# Patient Record
Sex: MALE | Race: WHITE | Employment: UNEMPLOYED | URBAN - METROPOLITAN AREA
[De-identification: names, ages, dates, MRNs, and addresses within clinical notes are randomized per-mention and may not be internally consistent; named-entity substitution may affect disease eponyms.]

---

## 2019-06-23 ENCOUNTER — HOSPITAL ENCOUNTER (EMERGENCY)
Age: 54
Discharge: HOME | End: 2019-06-23
Payer: COMMERCIAL

## 2019-06-23 VITALS
HEART RATE: 87 BPM | DIASTOLIC BLOOD PRESSURE: 80 MMHG | RESPIRATION RATE: 18 BRPM | OXYGEN SATURATION: 96 % | SYSTOLIC BLOOD PRESSURE: 133 MMHG | TEMPERATURE: 98.3 F

## 2019-06-23 DIAGNOSIS — L03.114 CELLULITIS OF LEFT UPPER EXTREMITY: ICD-10-CM

## 2019-06-23 DIAGNOSIS — M70.32 BURSITIS OF LEFT ELBOW, UNSPECIFIED BURSA: Primary | ICD-10-CM

## 2019-06-23 PROCEDURE — 99282 EMERGENCY DEPT VISIT SF MDM: CPT

## 2019-06-23 PROCEDURE — 6370000000 HC RX 637 (ALT 250 FOR IP): Performed by: PHYSICIAN ASSISTANT

## 2019-06-23 RX ORDER — SULFAMETHOXAZOLE AND TRIMETHOPRIM 800; 160 MG/1; MG/1
2 TABLET ORAL ONCE
Status: COMPLETED | OUTPATIENT
Start: 2019-06-23 | End: 2019-06-23

## 2019-06-23 RX ORDER — CEPHALEXIN 250 MG/1
500 CAPSULE ORAL ONCE
Status: COMPLETED | OUTPATIENT
Start: 2019-06-23 | End: 2019-06-23

## 2019-06-23 RX ORDER — SULFAMETHOXAZOLE AND TRIMETHOPRIM 800; 160 MG/1; MG/1
2 TABLET ORAL 2 TIMES DAILY
Qty: 40 TABLET | Refills: 0 | Status: SHIPPED | OUTPATIENT
Start: 2019-06-23 | End: 2019-07-03

## 2019-06-23 RX ORDER — CEPHALEXIN 500 MG/1
500 CAPSULE ORAL 4 TIMES DAILY
Qty: 40 CAPSULE | Refills: 0 | Status: SHIPPED | OUTPATIENT
Start: 2019-06-23

## 2019-06-23 RX ADMIN — CEPHALEXIN 500 MG: 250 CAPSULE ORAL at 10:57

## 2019-06-23 RX ADMIN — SULFAMETHOXAZOLE AND TRIMETHOPRIM 2 TABLET: 800; 160 TABLET ORAL at 10:57

## 2019-06-23 ASSESSMENT — PAIN DESCRIPTION - PAIN TYPE: TYPE: ACUTE PAIN

## 2019-06-23 ASSESSMENT — PAIN SCALES - GENERAL: PAINLEVEL_OUTOF10: 4

## 2019-06-23 NOTE — ED PROVIDER NOTES
**EVALUATED BY ADVANCED PRACTICE PROVIDER**        Bettie Howard 58 ENCOUNTER      Pt Name: George Zapata  BTK:[id number]  Robertotrongfkevin 1965  Date of evaluation: 6/23/2019  Provider: Judith Dozier PA-C      Chief Complaint:    Chief Complaint   Patient presents with    Abscess     pt noticed yesterday that his left elbow was swelling noted some sort of poss bug bite. red, inflammed and poss abscess       Nursing Notes, Past Medical Hx, Past Surgical Hx, Social Hx, Allergies, and Family Hx were all reviewed and agreed with or any disagreements were addressed in the HPI.    HPI:  (Location, Duration, Timing, Severity,Quality, Assoc Sx, Context, Modifying factors)  This is a  47 y.o. male that presents to the emergency department with a chief complaint of left elbow swelling and redness that began yesterday. Patient denies any history of IV drug use, kidney disease or diabetes. Denies injury, trauma, nausea, vomiting, fevers. Does have history of MRSA and had left knee surgery due to infected bursa in the past.  Denies any drainage, numbness or decreased range of motion. Rates the pain a 4 out of 10. Denies any other symptoms. PastMedical/Surgical History:      Diagnosis Date    ADD (attention deficit disorder)     Anxiety          Procedure Laterality Date    CARPAL TUNNEL RELEASE      KNEE SURGERY  2006       Medications:  Previous Medications    AMPHETAMINE-DEXTROAMPHETAMINE (ADDERALL, 10MG,) 10 MG TABLET    Take 10 mg by mouth daily. 10-15 mg two times a day    CELECOXIB (CELEBREX) 100 MG CAPSULE    Take 100 mg by mouth daily. CLONAZEPAM (KLONOPIN) 1 MG TABLET    Take 1 mg by mouth 2 times daily as needed for Anxiety. IBUPROFEN (ADVIL;MOTRIN) 800 MG TABLET    Take 1 tablet by mouth every 6 hours as needed for Pain. IBUPROFEN (IBU) 600 MG TABLET    Take 1 tablet by mouth every 8 hours as needed for Pain for 20 doses.     MELOXICAM returned at the time of this note. RADIOLOGY:   Non-plain film images such as CT, Ultrasound and MRI are read by the radiologist. Jewel Gale PA-C have directly visualized the radiologic plain film image(s) with the below findings:        Interpretation per the Radiologist below, if available at the time of thisnote:    No orders to display        No results found. MEDICAL DECISION MAKING / ED COURSE:      PROCEDURES:   Procedures    None    Patient was given:  Medications   cephALEXin (KEFLEX) capsule 500 mg (500 mg Oral Given 6/23/19 1057)   sulfamethoxazole-trimethoprim (BACTRIM DS;SEPTRA DS) 800-160 MG per tablet 2 tablet (2 tablets Oral Given 6/23/19 1057)       Patient presented with some left elbow swelling and redness. There is some mild cellulitis around the left arm with evidence of bursitis. Patient has full range of motion with minimal to no pain of movement of his left elbow. He would prefer to go home. He states this is happened in the past.  Suspect infective bursitis with some cellulitis. Low suspicion for septic arthritis, necrotizing fasciitis, cellulitis requiring IV antibiotics at this time. Patient has no bony tenderness. He will follow-up with orthopedics understood the strict return precautions to return for fevers, spreading redness or any worsening of symptoms. Will be given his first dose antibiotics here. He understood the strict return precautions and was stable discharge. The patient tolerated their visit well. I evaluated the patient. The physician was available for consultation as needed. The patient and / or the family were informed of the results of anytests, a time was given to answer questions, a plan was proposed and they agreed with plan. CLINICAL IMPRESSION:  1. Bursitis of left elbow, unspecified bursa    2.  Cellulitis of left upper extremity        DISPOSITION Decision To Discharge 06/23/2019 10:49:43 AM      PATIENT REFERRED TO:  Ijeoma Barrett

## 2019-06-23 NOTE — ED NOTES
Discharge instructions discussed with pt; verbalized understanding. Discussed all new medications (Keflex and Bactrim) use and side effects; verbalized understanding. Discussed follow up with Dr. Mariano Barahona; verbalized understanding. All questions answered. Pt d/c home with all of belongings.         Ivan Khan RN  06/23/19 [id number]

## 2020-01-22 ENCOUNTER — HOSPITAL ENCOUNTER (OUTPATIENT)
Dept: MRI IMAGING | Age: 55
Discharge: HOME | End: 2020-01-22
Payer: COMMERCIAL

## 2020-01-22 PROCEDURE — 72148 MRI LUMBAR SPINE W/O DYE: CPT

## 2022-06-14 ENCOUNTER — APPOINTMENT (OUTPATIENT)
Dept: GENERAL RADIOLOGY | Age: 57
End: 2022-06-14
Payer: COMMERCIAL

## 2022-06-14 ENCOUNTER — HOSPITAL ENCOUNTER (EMERGENCY)
Age: 57
Discharge: HOME | End: 2022-06-14
Payer: COMMERCIAL

## 2022-06-14 VITALS
SYSTOLIC BLOOD PRESSURE: 122 MMHG | BODY MASS INDEX: 29.22 KG/M2 | WEIGHT: 235 LBS | OXYGEN SATURATION: 96 % | DIASTOLIC BLOOD PRESSURE: 81 MMHG | TEMPERATURE: 98.2 F | HEIGHT: 75 IN | HEART RATE: 80 BPM | RESPIRATION RATE: 20 BRPM

## 2022-06-14 DIAGNOSIS — R07.9 CHEST PAIN, UNSPECIFIED TYPE: Primary | ICD-10-CM

## 2022-06-14 LAB
A/G RATIO: 1.4 (ref 1.1–2.2)
ALBUMIN SERPL-MCNC: 4.3 G/DL (ref 3.4–5)
ALP BLD-CCNC: 117 U/L (ref 40–129)
ALT SERPL-CCNC: 30 U/L (ref 10–40)
ANION GAP SERPL CALCULATED.3IONS-SCNC: 10 MMOL/L (ref 3–16)
AST SERPL-CCNC: 20 U/L (ref 15–37)
BASOPHILS ABSOLUTE: 0.1 K/UL (ref 0–0.2)
BASOPHILS RELATIVE PERCENT: 0.7 %
BILIRUB SERPL-MCNC: <0.2 MG/DL (ref 0–1)
BUN BLDV-MCNC: 19 MG/DL (ref 7–20)
CALCIUM SERPL-MCNC: 9.7 MG/DL (ref 8.3–10.6)
CHLORIDE BLD-SCNC: 104 MMOL/L (ref 99–110)
CO2: 22 MMOL/L (ref 21–32)
CREAT SERPL-MCNC: 1.1 MG/DL (ref 0.9–1.3)
EOSINOPHILS ABSOLUTE: 0.2 K/UL (ref 0–0.6)
EOSINOPHILS RELATIVE PERCENT: 2.5 %
GFR AFRICAN AMERICAN: >60
GFR NON-AFRICAN AMERICAN: >60
GLUCOSE BLD-MCNC: 119 MG/DL (ref 70–99)
HCT VFR BLD CALC: 45.6 % (ref 40.5–52.5)
HEMOGLOBIN: 15.3 G/DL (ref 13.5–17.5)
INR BLD: 0.92 (ref 0.87–1.14)
LIPASE: 43 U/L (ref 13–60)
LYMPHOCYTES ABSOLUTE: 2.4 K/UL (ref 1–5.1)
LYMPHOCYTES RELATIVE PERCENT: 31.9 %
MCH RBC QN AUTO: 32.9 PG (ref 26–34)
MCHC RBC AUTO-ENTMCNC: 33.6 G/DL (ref 31–36)
MCV RBC AUTO: 98 FL (ref 80–100)
MONOCYTES ABSOLUTE: 1 K/UL (ref 0–1.3)
MONOCYTES RELATIVE PERCENT: 12.7 %
NEUTROPHILS ABSOLUTE: 4 K/UL (ref 1.7–7.7)
NEUTROPHILS RELATIVE PERCENT: 52.2 %
PDW BLD-RTO: 13.4 % (ref 12.4–15.4)
PLATELET # BLD: 312 K/UL (ref 135–450)
PMV BLD AUTO: 8.8 FL (ref 5–10.5)
POTASSIUM REFLEX MAGNESIUM: 4.5 MMOL/L (ref 3.5–5.1)
PRO-BNP: 12 PG/ML (ref 0–124)
PROTHROMBIN TIME: 12.2 SEC (ref 11.7–14.5)
RBC # BLD: 4.66 M/UL (ref 4.2–5.9)
SODIUM BLD-SCNC: 136 MMOL/L (ref 136–145)
TOTAL PROTEIN: 7.3 G/DL (ref 6.4–8.2)
TROPONIN: <0.01 NG/ML
WBC # BLD: 7.6 K/UL (ref 4–11)

## 2022-06-14 PROCEDURE — 85610 PROTHROMBIN TIME: CPT

## 2022-06-14 PROCEDURE — 85025 COMPLETE CBC W/AUTO DIFF WBC: CPT

## 2022-06-14 PROCEDURE — 84484 ASSAY OF TROPONIN QUANT: CPT

## 2022-06-14 PROCEDURE — 83880 ASSAY OF NATRIURETIC PEPTIDE: CPT

## 2022-06-14 PROCEDURE — 80053 COMPREHEN METABOLIC PANEL: CPT

## 2022-06-14 PROCEDURE — 93005 ELECTROCARDIOGRAM TRACING: CPT

## 2022-06-14 PROCEDURE — 83690 ASSAY OF LIPASE: CPT

## 2022-06-14 PROCEDURE — 71045 X-RAY EXAM CHEST 1 VIEW: CPT

## 2022-06-14 PROCEDURE — 99285 EMERGENCY DEPT VISIT HI MDM: CPT

## 2022-06-14 PROCEDURE — 6370000000 HC RX 637 (ALT 250 FOR IP): Performed by: PHYSICIAN ASSISTANT

## 2022-06-14 RX ORDER — VALSARTAN 160 MG/1
160 TABLET ORAL DAILY
COMMUNITY

## 2022-06-14 RX ORDER — MAGNESIUM HYDROXIDE/ALUMINUM HYDROXICE/SIMETHICONE 120; 1200; 1200 MG/30ML; MG/30ML; MG/30ML
SUSPENSION ORAL
Status: DISCONTINUED
Start: 2022-06-14 | End: 2022-06-14 | Stop reason: HOSPADM

## 2022-06-14 RX ORDER — AMLODIPINE BESYLATE 10 MG/1
10 TABLET ORAL DAILY
COMMUNITY

## 2022-06-14 RX ADMIN — LIDOCAINE HYDROCHLORIDE: 20 SOLUTION ORAL; TOPICAL at 16:39

## 2022-06-14 ASSESSMENT — PAIN SCALES - GENERAL: PAINLEVEL_OUTOF10: 6

## 2022-06-14 ASSESSMENT — PAIN DESCRIPTION - LOCATION: LOCATION: CHEST

## 2022-06-14 ASSESSMENT — PAIN - FUNCTIONAL ASSESSMENT: PAIN_FUNCTIONAL_ASSESSMENT: 0-10

## 2022-06-14 NOTE — ED NOTES
Discharge and education instructions reviewed. Patient verbalized understanding, teach-back successful. Patient denied questions at this time. No acute distress noted. Patient instructed to follow-up as noted - return to emergency department if symptoms worsen. Patient verbalized understanding. Discharged per EDMD with discharged instructions.        Ny Jewell RN  06/14/22 [id number]

## 2022-06-14 NOTE — ED PROVIDER NOTES
PEPE. I have evaluated this patient. My supervising physician was available for consultation. Chief Complaint:   Chief Complaint   Patient presents with    Chest Pain     Pt c/o mid sternal constant CP times 1 week with bilat hand numbness that started at the same time. ED Course & Medical Decision Making  55 y.o. male presenting with chest pain. -CBC/chem: Normal  -LFT/Lipase: not concerning    -Trop: Negative  -BNP: 12    -Chest xr: lungs clear    -EKG: NSR    MDM: This 52-year-old male presents with chest pain x1 month. His chest pain is nonexertional, nonradiating does not get better or worse. His cardiac work-up is negative. His heart score is 2. He is [place] Low risk. We observed him in the emergency department, and feel as though he is stable for discharge with close primary care follow-up    The GI cocktail I gave him did not help his pain at all. Final Clinical Impression & Plan:  Chest pain  - f/u with PCP. Call and schedule tomorrow  - ED return precautions given and reviewed, questions answered. ---------------------------------------------------------------------------------------------------------------  HPI:  PMH significant for anxiety     Presenting with left-sided chest pain. Nonradiating nonexertional.  No shortness of breath. No fevers, chills, nausea, vomiting. He denies unilateral leg swelling. No history of DVT. No hemoptysis, no malignancy no immobilization or surgery recently. He is recently lost both parents in the past few months. He has no family history of early cardiac death. Is this patient to be included in the SEP-1 Core Measure due to severe sepsis or septic shock? No   Exclusion criteria - the patient is NOT to be included for SEP-1 Core Measure due to:   Infection is not suspected      Medical Hx: Past medical history reviewed, and pertinent for:     Past Medical History:   Diagnosis Date    ADD (attention deficit disorder)     Anxiety        There is no problem list on file for this patient. Surgical Hx:   Past surgical history reviewed, and pertinent for:      Past Surgical History:   Procedure Laterality Date    CARPAL TUNNEL RELEASE      KNEE SURGERY  2006       Social Hx:       Social History     Socioeconomic History    Marital status: Single     Spouse name: Not on file    Number of children: Not on file    Years of education: Not on file    Highest education level: Not on file   Occupational History    Not on file   Tobacco Use    Smoking status: Current Every Day Smoker     Packs/day: 1.00     Types: Cigarettes    Smokeless tobacco: Never Used   Substance and Sexual Activity    Alcohol use: No    Drug use: Yes     Types: Marijuana Norval Remak)    Sexual activity: Not Currently   Other Topics Concern    Not on file   Social History Narrative    Not on file     Social Determinants of Health     Financial Resource Strain:     Difficulty of Paying Living Expenses: Not on file   Food Insecurity:     Worried About Running Out of Food in the Last Year: Not on file    Court of Food in the Last Year: Not on file   Transportation Needs:     Lack of Transportation (Medical): Not on file    Lack of Transportation (Non-Medical):  Not on file   Physical Activity:     Days of Exercise per Week: Not on file    Minutes of Exercise per Session: Not on file   Stress:     Feeling of Stress : Not on file   Social Connections:     Frequency of Communication with Friends and Family: Not on file    Frequency of Social Gatherings with Friends and Family: Not on file    Attends Zoroastrian Services: Not on file    Active Member of Clubs or Organizations: Not on file    Attends Club or Organization Meetings: Not on file    Marital Status: Not on file   Intimate Partner Violence:     Fear of Current or Ex-Partner: Not on file    Emotionally Abused: Not on file    Physically Abused: Not on file    Sexually Abused: Not on file Housing Stability:     Unable to Pay for Housing in the Last Year: Not on file    Number of Places Lived in the Last Year: Not on file    Unstable Housing in the Last Year: Not on file         Medications:  Previous Medications    AMLODIPINE (NORVASC) 10 MG TABLET    Take 10 mg by mouth daily    AMPHETAMINE-DEXTROAMPHETAMINE (ADDERALL, 10MG,) 10 MG TABLET    Take 10 mg by mouth daily. 10-15 mg two times a day    CELECOXIB (CELEBREX) 100 MG CAPSULE    Take 100 mg by mouth daily. CEPHALEXIN (KEFLEX) 500 MG CAPSULE    Take 1 capsule by mouth 4 times daily    CLONAZEPAM (KLONOPIN) 1 MG TABLET    Take 1 mg by mouth 2 times daily as needed for Anxiety. IBUPROFEN (ADVIL;MOTRIN) 800 MG TABLET    Take 1 tablet by mouth every 6 hours as needed for Pain. IBUPROFEN (IBU) 600 MG TABLET    Take 1 tablet by mouth every 8 hours as needed for Pain for 20 doses. MELOXICAM (MOBIC) 15 MG TABLET    Take 15 mg by mouth daily. PREGABALIN (LYRICA) 150 MG CAPSULE    Take 150 mg by mouth 2 times daily. VALSARTAN (DIOVAN) 160 MG TABLET    Take 160 mg by mouth daily       Allergies:  Patient has no known allergies.     ROS:  10pt review of systems was performed and was negative except as noted in HPI     Imaging:  No orders to display       Labs:  Results for orders placed or performed during the hospital encounter of 06/14/22   EKG 12 Lead   Result Value Ref Range    Ventricular Rate 77 BPM    Atrial Rate 77 BPM    P-R Interval 128 ms    QRS Duration 100 ms    Q-T Interval 376 ms    QTc Calculation (Bazett) 425 ms    P Axis 70 degrees    R Axis 38 degrees    T Axis 61 degrees    Diagnosis Normal sinus rhythmNormal ECG        Screenings:     [place] Coma Scale  Eye Opening: Spontaneous  Best Verbal Response: Oriented  Best Motor Response: Obeys commands  [place] Coma Scale Score: 15              Physical Exam:  Vitals: /81   Pulse 80   Temp 98.2 °F (36.8 °C) (Oral)   Resp 20   Ht 6' 3\" (1.905 m)   Wt 235 lb

## 2022-06-16 LAB
EKG ATRIAL RATE: 77 BPM
EKG DIAGNOSIS: NORMAL
EKG P AXIS: 70 DEGREES
EKG P-R INTERVAL: 128 MS
EKG Q-T INTERVAL: 376 MS
EKG QRS DURATION: 100 MS
EKG QTC CALCULATION (BAZETT): 425 MS
EKG R AXIS: 38 DEGREES
EKG T AXIS: 61 DEGREES
EKG VENTRICULAR RATE: 77 BPM

## 2022-06-16 PROCEDURE — 93010 ELECTROCARDIOGRAM REPORT: CPT | Performed by: INTERNAL MEDICINE

## 2024-02-10 ENCOUNTER — HOSPITAL ENCOUNTER (EMERGENCY)
Age: 59
Discharge: ANOTHER HOSPITAL | End: 2024-02-10
Attending: EMERGENCY MEDICINE
Payer: COMMERCIAL

## 2024-02-10 ENCOUNTER — APPOINTMENT (OUTPATIENT)
Dept: CT IMAGING | Age: 59
End: 2024-02-10
Payer: COMMERCIAL

## 2024-02-10 ENCOUNTER — APPOINTMENT (OUTPATIENT)
Dept: GENERAL RADIOLOGY | Age: 59
End: 2024-02-10
Payer: COMMERCIAL

## 2024-02-10 ENCOUNTER — HOSPITAL ENCOUNTER (INPATIENT)
Age: 59
LOS: 4 days | Discharge: HOME | DRG: 045 | End: 2024-02-14
Attending: INTERNAL MEDICINE | Admitting: INTERNAL MEDICINE
Payer: COMMERCIAL

## 2024-02-10 VITALS
DIASTOLIC BLOOD PRESSURE: 89 MMHG | TEMPERATURE: 97.8 F | OXYGEN SATURATION: 94 % | RESPIRATION RATE: 13 BRPM | WEIGHT: 241.1 LBS | BODY MASS INDEX: 31.95 KG/M2 | HEART RATE: 64 BPM | SYSTOLIC BLOOD PRESSURE: 149 MMHG | HEIGHT: 73 IN

## 2024-02-10 DIAGNOSIS — I63.9 CEREBROVASCULAR ACCIDENT (CVA), UNSPECIFIED MECHANISM (HCC): Primary | ICD-10-CM

## 2024-02-10 DIAGNOSIS — I10 ESSENTIAL HYPERTENSION: ICD-10-CM

## 2024-02-10 DIAGNOSIS — K11.8 PAROTID NODULE: ICD-10-CM

## 2024-02-10 PROBLEM — M79.7 FIBROMYOSITIS: Status: ACTIVE | Noted: 2019-10-29

## 2024-02-10 PROBLEM — M54.16 LUMBAR RADICULOPATHY, RIGHT: Status: ACTIVE | Noted: 2019-10-29

## 2024-02-10 PROBLEM — M70.61 TROCHANTERIC BURSITIS OF RIGHT HIP: Status: ACTIVE | Noted: 2017-02-10

## 2024-02-10 PROBLEM — M47.816 DEGENERATIVE ARTHRITIS OF LUMBAR SPINE: Status: ACTIVE | Noted: 2019-10-29

## 2024-02-10 PROBLEM — G89.4 CHRONIC PAIN DISORDER: Status: ACTIVE | Noted: 2019-10-29

## 2024-02-10 LAB
ALBUMIN SERPL-MCNC: 3.7 G/DL (ref 3.4–5)
ALBUMIN/GLOB SERPL: 1.2 {RATIO} (ref 1.1–2.2)
ALP SERPL-CCNC: 94 U/L (ref 40–129)
ALT SERPL-CCNC: 24 U/L (ref 10–40)
ANION GAP SERPL CALCULATED.3IONS-SCNC: 12 MMOL/L (ref 3–16)
ANION GAP SERPL CALCULATED.3IONS-SCNC: 13 MMOL/L (ref 3–16)
APTT BLD: 24.3 SEC (ref 22.7–35.9)
AST SERPL-CCNC: 17 U/L (ref 15–37)
BASOPHILS # BLD: 0.1 K/UL (ref 0–0.2)
BASOPHILS # BLD: 0.1 K/UL (ref 0–0.2)
BASOPHILS NFR BLD: 0.5 %
BASOPHILS NFR BLD: 0.7 %
BILIRUB SERPL-MCNC: 0.7 MG/DL (ref 0–1)
BUN SERPL-MCNC: 12 MG/DL (ref 7–20)
BUN SERPL-MCNC: 12 MG/DL (ref 7–20)
CALCIUM SERPL-MCNC: 8.6 MG/DL (ref 8.3–10.6)
CALCIUM SERPL-MCNC: 8.7 MG/DL (ref 8.3–10.6)
CHLORIDE SERPL-SCNC: 103 MMOL/L (ref 99–110)
CHLORIDE SERPL-SCNC: 106 MMOL/L (ref 99–110)
CK SERPL-CCNC: 245 U/L (ref 39–308)
CO2 SERPL-SCNC: 20 MMOL/L (ref 21–32)
CO2 SERPL-SCNC: 22 MMOL/L (ref 21–32)
CREAT SERPL-MCNC: 0.9 MG/DL (ref 0.9–1.3)
CREAT SERPL-MCNC: 1 MG/DL (ref 0.9–1.3)
DEPRECATED RDW RBC AUTO: 12.8 % (ref 12.4–15.4)
DEPRECATED RDW RBC AUTO: 12.9 % (ref 12.4–15.4)
EOSINOPHIL # BLD: 0.1 K/UL (ref 0–0.6)
EOSINOPHIL # BLD: 0.1 K/UL (ref 0–0.6)
EOSINOPHIL NFR BLD: 0.5 %
EOSINOPHIL NFR BLD: 0.5 %
GFR SERPLBLD CREATININE-BSD FMLA CKD-EPI: >60 ML/MIN/{1.73_M2}
GFR SERPLBLD CREATININE-BSD FMLA CKD-EPI: >60 ML/MIN/{1.73_M2}
GLUCOSE BLD-MCNC: 105 MG/DL (ref 70–99)
GLUCOSE SERPL-MCNC: 144 MG/DL (ref 70–99)
GLUCOSE SERPL-MCNC: 99 MG/DL (ref 70–99)
HCT VFR BLD AUTO: 41.1 % (ref 40.5–52.5)
HCT VFR BLD AUTO: 42.9 % (ref 40.5–52.5)
HGB BLD-MCNC: 13.7 G/DL (ref 13.5–17.5)
HGB BLD-MCNC: 14.7 G/DL (ref 13.5–17.5)
INR PPP: 0.98 (ref 0.84–1.16)
LYMPHOCYTES # BLD: 2.1 K/UL (ref 1–5.1)
LYMPHOCYTES # BLD: 2.7 K/UL (ref 1–5.1)
LYMPHOCYTES NFR BLD: 18.7 %
LYMPHOCYTES NFR BLD: 25 %
MAGNESIUM SERPL-MCNC: 1.9 MG/DL (ref 1.8–2.4)
MCH RBC QN AUTO: 32.3 PG (ref 26–34)
MCH RBC QN AUTO: 32.9 PG (ref 26–34)
MCHC RBC AUTO-ENTMCNC: 33.3 G/DL (ref 31–36)
MCHC RBC AUTO-ENTMCNC: 34.3 G/DL (ref 31–36)
MCV RBC AUTO: 96 FL (ref 80–100)
MCV RBC AUTO: 97.1 FL (ref 80–100)
MONOCYTES # BLD: 1.1 K/UL (ref 0–1.3)
MONOCYTES # BLD: 1.3 K/UL (ref 0–1.3)
MONOCYTES NFR BLD: 10 %
MONOCYTES NFR BLD: 11.6 %
NEUTROPHILS # BLD: 6.9 K/UL (ref 1.7–7.7)
NEUTROPHILS # BLD: 7.8 K/UL (ref 1.7–7.7)
NEUTROPHILS NFR BLD: 63.8 %
NEUTROPHILS NFR BLD: 68.7 %
NT-PROBNP SERPL-MCNC: 75 PG/ML (ref 0–124)
PERFORMED ON: ABNORMAL
PLATELET # BLD AUTO: 365 K/UL (ref 135–450)
PLATELET # BLD AUTO: 374 K/UL (ref 135–450)
PMV BLD AUTO: 7.9 FL (ref 5–10.5)
PMV BLD AUTO: 8.2 FL (ref 5–10.5)
POTASSIUM SERPL-SCNC: 3.6 MMOL/L (ref 3.5–5.1)
POTASSIUM SERPL-SCNC: 4.1 MMOL/L (ref 3.5–5.1)
PROT SERPL-MCNC: 6.8 G/DL (ref 6.4–8.2)
PROTHROMBIN TIME: 13 SEC (ref 11.5–14.8)
RBC # BLD AUTO: 4.23 M/UL (ref 4.2–5.9)
RBC # BLD AUTO: 4.47 M/UL (ref 4.2–5.9)
SODIUM SERPL-SCNC: 138 MMOL/L (ref 136–145)
SODIUM SERPL-SCNC: 138 MMOL/L (ref 136–145)
TROPONIN, HIGH SENSITIVITY: 11 NG/L (ref 0–22)
WBC # BLD AUTO: 10.9 K/UL (ref 4–11)
WBC # BLD AUTO: 11.3 K/UL (ref 4–11)

## 2024-02-10 PROCEDURE — 70498 CT ANGIOGRAPHY NECK: CPT

## 2024-02-10 PROCEDURE — 90471 IMMUNIZATION ADMIN: CPT | Performed by: EMERGENCY MEDICINE

## 2024-02-10 PROCEDURE — 85610 PROTHROMBIN TIME: CPT

## 2024-02-10 PROCEDURE — 84484 ASSAY OF TROPONIN QUANT: CPT

## 2024-02-10 PROCEDURE — 6370000000 HC RX 637 (ALT 250 FOR IP)

## 2024-02-10 PROCEDURE — 36415 COLL VENOUS BLD VENIPUNCTURE: CPT

## 2024-02-10 PROCEDURE — 2000000000 HC ICU R&B

## 2024-02-10 PROCEDURE — 99291 CRITICAL CARE FIRST HOUR: CPT

## 2024-02-10 PROCEDURE — 82550 ASSAY OF CK (CPK): CPT

## 2024-02-10 PROCEDURE — 6360000002 HC RX W HCPCS: Performed by: EMERGENCY MEDICINE

## 2024-02-10 PROCEDURE — 80053 COMPREHEN METABOLIC PANEL: CPT

## 2024-02-10 PROCEDURE — 83880 ASSAY OF NATRIURETIC PEPTIDE: CPT

## 2024-02-10 PROCEDURE — 2580000003 HC RX 258: Performed by: STUDENT IN AN ORGANIZED HEALTH CARE EDUCATION/TRAINING PROGRAM

## 2024-02-10 PROCEDURE — 83735 ASSAY OF MAGNESIUM: CPT

## 2024-02-10 PROCEDURE — 85025 COMPLETE CBC W/AUTO DIFF WBC: CPT

## 2024-02-10 PROCEDURE — 71045 X-RAY EXAM CHEST 1 VIEW: CPT

## 2024-02-10 PROCEDURE — 80048 BASIC METABOLIC PNL TOTAL CA: CPT

## 2024-02-10 PROCEDURE — 99285 EMERGENCY DEPT VISIT HI MDM: CPT

## 2024-02-10 PROCEDURE — 6360000004 HC RX CONTRAST MEDICATION: Performed by: PHYSICIAN ASSISTANT

## 2024-02-10 PROCEDURE — 93005 ELECTROCARDIOGRAM TRACING: CPT | Performed by: EMERGENCY MEDICINE

## 2024-02-10 PROCEDURE — 90714 TD VACC NO PRESV 7 YRS+ IM: CPT | Performed by: EMERGENCY MEDICINE

## 2024-02-10 PROCEDURE — 70450 CT HEAD/BRAIN W/O DYE: CPT

## 2024-02-10 PROCEDURE — 85730 THROMBOPLASTIN TIME PARTIAL: CPT

## 2024-02-10 PROCEDURE — 6370000000 HC RX 637 (ALT 250 FOR IP): Performed by: STUDENT IN AN ORGANIZED HEALTH CARE EDUCATION/TRAINING PROGRAM

## 2024-02-10 PROCEDURE — 6370000000 HC RX 637 (ALT 250 FOR IP): Performed by: EMERGENCY MEDICINE

## 2024-02-10 RX ORDER — LABETALOL HYDROCHLORIDE 5 MG/ML
10 INJECTION, SOLUTION INTRAVENOUS EVERY 4 HOURS PRN
Status: DISCONTINUED | OUTPATIENT
Start: 2024-02-10 | End: 2024-02-14 | Stop reason: HOSPADM

## 2024-02-10 RX ORDER — SODIUM CHLORIDE 0.9 % (FLUSH) 0.9 %
5-40 SYRINGE (ML) INJECTION EVERY 12 HOURS SCHEDULED
Status: DISCONTINUED | OUTPATIENT
Start: 2024-02-10 | End: 2024-02-14 | Stop reason: HOSPADM

## 2024-02-10 RX ORDER — ACETAMINOPHEN 325 MG/1
650 TABLET ORAL EVERY 4 HOURS PRN
Status: DISCONTINUED | OUTPATIENT
Start: 2024-02-10 | End: 2024-02-14 | Stop reason: HOSPADM

## 2024-02-10 RX ORDER — NICOTINE 21 MG/24HR
1 PATCH, TRANSDERMAL 24 HOURS TRANSDERMAL DAILY
Status: DISCONTINUED | OUTPATIENT
Start: 2024-02-10 | End: 2024-02-14 | Stop reason: HOSPADM

## 2024-02-10 RX ORDER — POLYETHYLENE GLYCOL 3350 17 G/17G
17 POWDER, FOR SOLUTION ORAL DAILY PRN
Status: DISCONTINUED | OUTPATIENT
Start: 2024-02-10 | End: 2024-02-14 | Stop reason: HOSPADM

## 2024-02-10 RX ORDER — ASPIRIN 81 MG/1
81 TABLET, CHEWABLE ORAL DAILY
Status: DISCONTINUED | OUTPATIENT
Start: 2024-02-11 | End: 2024-02-14 | Stop reason: HOSPADM

## 2024-02-10 RX ORDER — ASPIRIN 300 MG/1
300 SUPPOSITORY RECTAL ONCE
Status: COMPLETED | OUTPATIENT
Start: 2024-02-10 | End: 2024-02-10

## 2024-02-10 RX ORDER — SODIUM CHLORIDE 0.9 % (FLUSH) 0.9 %
5-40 SYRINGE (ML) INJECTION PRN
Status: DISCONTINUED | OUTPATIENT
Start: 2024-02-10 | End: 2024-02-14 | Stop reason: HOSPADM

## 2024-02-10 RX ORDER — SODIUM CHLORIDE 9 MG/ML
INJECTION, SOLUTION INTRAVENOUS PRN
Status: DISCONTINUED | OUTPATIENT
Start: 2024-02-10 | End: 2024-02-14 | Stop reason: HOSPADM

## 2024-02-10 RX ORDER — ONDANSETRON 4 MG/1
4 TABLET, ORALLY DISINTEGRATING ORAL EVERY 8 HOURS PRN
Status: DISCONTINUED | OUTPATIENT
Start: 2024-02-10 | End: 2024-02-14 | Stop reason: HOSPADM

## 2024-02-10 RX ORDER — ONDANSETRON 2 MG/ML
4 INJECTION INTRAMUSCULAR; INTRAVENOUS EVERY 6 HOURS PRN
Status: DISCONTINUED | OUTPATIENT
Start: 2024-02-10 | End: 2024-02-14 | Stop reason: HOSPADM

## 2024-02-10 RX ORDER — ATORVASTATIN CALCIUM 80 MG/1
80 TABLET, FILM COATED ORAL NIGHTLY
Status: DISCONTINUED | OUTPATIENT
Start: 2024-02-10 | End: 2024-02-14 | Stop reason: HOSPADM

## 2024-02-10 RX ORDER — ASPIRIN 300 MG/1
300 SUPPOSITORY RECTAL DAILY
Status: DISCONTINUED | OUTPATIENT
Start: 2024-02-11 | End: 2024-02-14 | Stop reason: HOSPADM

## 2024-02-10 RX ADMIN — IOPAMIDOL 75 ML: 755 INJECTION, SOLUTION INTRAVENOUS at 13:22

## 2024-02-10 RX ADMIN — CLOSTRIDIUM TETANI TOXOID ANTIGEN (FORMALDEHYDE INACTIVATED) AND CORYNEBACTERIUM DIPHTHERIAE TOXOID ANTIGEN (FORMALDEHYDE INACTIVATED) 0.5 ML: 5; 2 INJECTION, SUSPENSION INTRAMUSCULAR at 14:25

## 2024-02-10 RX ADMIN — ASPIRIN 300 MG: 300 SUPPOSITORY RECTAL at 14:26

## 2024-02-10 RX ADMIN — SODIUM CHLORIDE, PRESERVATIVE FREE 10 ML: 5 INJECTION INTRAVENOUS at 20:43

## 2024-02-10 RX ADMIN — ACETAMINOPHEN 650 MG: 325 TABLET ORAL at 20:40

## 2024-02-10 RX ADMIN — ATORVASTATIN CALCIUM 80 MG: 80 TABLET, FILM COATED ORAL at 20:39

## 2024-02-10 ASSESSMENT — ENCOUNTER SYMPTOMS
RESPIRATORY NEGATIVE: 1
DIARRHEA: 0
COUGH: 0
CONSTIPATION: 0
COLOR CHANGE: 0
BACK PAIN: 0
SHORTNESS OF BREATH: 0
PHOTOPHOBIA: 0
VOMITING: 0
CHEST TIGHTNESS: 0
ABDOMINAL PAIN: 0
NAUSEA: 0

## 2024-02-10 ASSESSMENT — PAIN SCALES - GENERAL: PAINLEVEL_OUTOF10: 3

## 2024-02-10 ASSESSMENT — PAIN - FUNCTIONAL ASSESSMENT: PAIN_FUNCTIONAL_ASSESSMENT: 0-10

## 2024-02-10 ASSESSMENT — PAIN DESCRIPTION - LOCATION: LOCATION: HEAD

## 2024-02-10 NOTE — PLAN OF CARE
[facility] Hospitalist Transfer accept note  Transfer center PS received    Case reviewed with ER physician      Santiago Muniz 58 y.o. male    - p/w at [facility] with left leg weakness and fell to the ground at 0400 after he got up to go to the bathroom.     Please note that the below represents documentation derived following discussion with ED physician and review of available data, and is not intended to represent an assessment and the plan which will be formulated after patient is independently evaluated.      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    ICU admission for Q1 neuro-checks    Stroke w/u and mgt    Neuro-CC consult    PT/OT/SLP consult       Patient has been accepted for transfer to [facility].     Once patient arrives the ICU, please notify the ICU residents, and please page ON CALL HOSPITALIST so patient can be seen.     If unable to reach physician on [employer] please call hospitalist phone (#[phone])       PCP: Ramírez Worrell Jr., MD     Thanks  Timothy Cardona MD  Hospitalist

## 2024-02-10 NOTE — H&P
Internal Medicine  PGY 1  History & Physical      CC L sided weakness    History Obtained From:  patient    HISTORY OF PRESENT ILLNESS:  Mr Muniz is a 59 yo M with a PMHx of HTN, HLD, ADD who presented with L sided weakness. States he was getting up to go to the bathroom around 0400 this morning and experience weakness in his legs and L arm that caused him to fall down the steps. Pt then called EMS to come to hospital.    In [place] ED, pt was hypertensive with BP in 150s/90s. VS otherwise stable. Presented with L upper and lower extremity weakness. CT head and CTA head and neck demonstrated acute R M2 occlusion. Was outside of the window to receive TNK.    Past Medical History:        Diagnosis Date    ADD (attention deficit disorder)     Anxiety     Hyperlipidemia     Hypertension        Past Surgical History:        Procedure Laterality Date    CARPAL TUNNEL RELEASE      KNEE SURGERY  2006       Medications Priorto Admission:    Medications Prior to Admission: amLODIPine (NORVASC) 10 MG tablet, Take 10 mg by mouth daily (Patient not taking: Reported on 2/10/2024)  valsartan (DIOVAN) 160 MG tablet, Take 160 mg by mouth daily (Patient not taking: Reported on 2/10/2024)  cephALEXin (KEFLEX) 500 MG capsule, Take 1 capsule by mouth 4 times daily (Patient not taking: Reported on 6/14/2022)  meloxicam (MOBIC) 15 MG tablet, Take 15 mg by mouth daily. (Patient not taking: Reported on 6/14/2022)  pregabalin (LYRICA) 150 MG capsule, Take 150 mg by mouth 2 times daily. (Patient not taking: Reported on 6/14/2022)  ibuprofen (ADVIL;MOTRIN) 800 MG tablet, Take 1 tablet by mouth every 6 hours as needed for Pain. (Patient not taking: Reported on 6/14/2022)  celecoxib (CELEBREX) 100 MG capsule, Take 100 mg by mouth daily. (Patient not taking: Reported on 6/14/2022)  clonazePAM (KLONOPIN) 1 MG tablet, Take 1 mg by mouth 2 times daily as needed for Anxiety. (Patient not taking: Reported on

## 2024-02-10 NOTE — ED NOTES
Report given to ICU RN at [facility], no further questions at this time.   Report given to transport team, no further questions at this time. Pt alert and oriented upon transport.

## 2024-02-10 NOTE — ED NOTES
Pt voided per urinal, I assisted pt with using it  Pt was able to scoot himself up in the bed using his right leg and right arm  Pt updated on plan for transfer

## 2024-02-10 NOTE — ED PROVIDER NOTES
[facility] EMERGENCY DEPARTMENT  EMERGENCY DEPARTMENT ENCOUNTER        Pt Name: Santiago Muniz  MRN: [id number]  Birthdate 1965  Date of evaluation: 2/10/2024  Provider: CRIS Rosenthal  PCP: Ramírez Worrell Jr., MD  Note Started: 3:00 PM EST 2/10/24       I have seen and evaluated this patient with my supervising physician Mark Bryan DO.      CHIEF COMPLAINT       Chief Complaint   Patient presents with    Extremity Weakness     Pt brought in per [facility], stroke alert called in the field, pt reports he got up at 0400 this am to use the bathroom he was walking up the stairs when his right ankle gave out causing him to fall down the steps, pt then reports left sided weakness and was not able to get up eventually crawled to his phone.  Pt arrives with left sided weakness, left side facial droop.  AAO x 3.       HISTORY OF PRESENT ILLNESS: 1 or more Elements     History From: patient  Limitations to history : None    Santiago Muniz is a 58 y.o. male with past medical history of hypertension, hyperlipidemia and anxiety who presents ED with complaint of weakness to his left arm and left leg.  Patient arrives by [facility].  He was stroke alerted in the field.  He reports he went to bed last night at midnight.  He woke up at 4 AM to go to the bathroom.  He states after he got to the bathroom he was walking upstairs when he states he tripped.  He fell and was unable to get up after he fell because he states his legs were weak.  He denies any blood thinners.  He crawled around on the ground and then she was able to reach the phone and called EMS who brought him to the ED for further evaluation and treatment.  He is complaining of weakness to his left arm and left leg with some feelings of tingling to his left arm.  He denies any headache, visual changes, speech disturbances or other numbness/tingling.  Denies any weakness of the right arm.  He denies any chest pain,  Mental Status: He is alert and oriented to person, place, and time.      GCS: GCS eye subscore is 4. GCS verbal subscore is 5. GCS motor subscore is 6.      Cranial Nerves: Facial asymmetry present. No cranial nerve deficit or dysarthria.      Sensory: Sensory deficit present.      Motor: Weakness present. No tremor.      Coordination: Finger-Nose-Finger Test abnormal.      Gait: Gait abnormal.      Comments: Full range of motion and strength of the right upper extremity and right lower extremity.  He is able to hold the left upper extremity up against gravity but it does drift.  Does not hit the bed.  Left lower extremity again drifts but does not hit bed.  He appears to have some difficulty with ataxia to the left upper and left lower extremity.  Diminished sensation to the left upper extremity compared to the right.  Speech is clear.  Faint left-sided facial droop.  Unable to ambulate here in the emergency department.   Psychiatric:         Behavior: Behavior normal.           DIAGNOSTIC RESULTS   LABS:    Labs Reviewed   CBC WITH AUTO DIFFERENTIAL - Abnormal; Notable for the following components:       Result Value    WBC 11.3 (*)     Neutrophils Absolute 7.8 (*)     All other components within normal limits   COMPREHENSIVE METABOLIC PANEL W/ REFLEX TO MG FOR LOW K - Abnormal; Notable for the following components:    CO2 20 (*)     All other components within normal limits   POCT GLUCOSE - Abnormal; Notable for the following components:    POC Glucose 105 (*)     All other components within normal limits   CK   TROPONIN   BRAIN NATRIURETIC PEPTIDE   PROTIME-INR   APTT   URINALYSIS WITH REFLEX TO CULTURE       When ordered only abnormal lab results are displayed. All other labs were within normal range or not returned as of this dictation.    EKG: When ordered, EKG's are interpreted by the Emergency Department Physician in the absence of a cardiologist.  Please see their note for interpretation of

## 2024-02-10 NOTE — PLAN OF CARE
Brief Stroke Team Plan of Care Note    I discussed the patient with the ED provider but the tele system was not working so was unfortunately unable to evaluate the patient on video. In brief, Mr. Muniz woke at 0400 and got up to go to the bathroom. He was initially able to walk normally but then suddenly developed left leg weakness and fell to the ground. He could not get up but eventually was able to crawl to the phone and call EMS. On arrival to the ED, exam was notable for NIHSS 3 with left arm and leg drift (did not hit bed) and mild flattening of the left nasolabial fold. He could not ambulate because of his symptoms. He did not have any other deficits.    CT head shows an evolving right frontoparietal infarct, no hemorrhage. CTA shows a R M2 occlusion.    Patient was not a candidate for consideration of lytics per wake-up protocol as he presented >3.5h from waking up. Discussed his case with neurointerventional team who felt that, given his low NIHSS coupled with the increased risk associated with going after a more distal occlusion, EVT would not be worth the risk. The patient should be transferred to [facility] for close neuro monitoring and BP augmentation. If the patient's exam worsens, please call stroke team immediately for reconsideration of EVT.

## 2024-02-11 ENCOUNTER — APPOINTMENT (OUTPATIENT)
Dept: MRI IMAGING | Age: 59
DRG: 045 | End: 2024-02-11
Attending: INTERNAL MEDICINE
Payer: COMMERCIAL

## 2024-02-11 PROBLEM — F17.200 SMOKING: Status: ACTIVE | Noted: 2024-02-11

## 2024-02-11 PROBLEM — I10 PRIMARY HYPERTENSION: Status: ACTIVE | Noted: 2024-02-11

## 2024-02-11 PROBLEM — I63.411 CEREBROVASCULAR ACCIDENT (CVA) DUE TO EMBOLISM OF RIGHT MIDDLE CEREBRAL ARTERY (HCC): Status: ACTIVE | Noted: 2024-02-10

## 2024-02-11 LAB
ANION GAP SERPL CALCULATED.3IONS-SCNC: 12 MMOL/L (ref 3–16)
BASOPHILS # BLD: 0.1 K/UL (ref 0–0.2)
BASOPHILS NFR BLD: 0.7 %
BUN SERPL-MCNC: 14 MG/DL (ref 7–20)
CALCIUM SERPL-MCNC: 8.5 MG/DL (ref 8.3–10.6)
CHLORIDE SERPL-SCNC: 105 MMOL/L (ref 99–110)
CHOLEST SERPL-MCNC: 156 MG/DL (ref 0–199)
CO2 SERPL-SCNC: 23 MMOL/L (ref 21–32)
CREAT SERPL-MCNC: 1 MG/DL (ref 0.9–1.3)
DEPRECATED RDW RBC AUTO: 12.9 % (ref 12.4–15.4)
EKG ATRIAL RATE: 71 BPM
EKG DIAGNOSIS: NORMAL
EKG P-R INTERVAL: 120 MS
EKG Q-T INTERVAL: 364 MS
EKG QRS DURATION: 100 MS
EKG QTC CALCULATION (BAZETT): 395 MS
EKG R AXIS: -14 DEGREES
EKG T AXIS: 132 DEGREES
EKG VENTRICULAR RATE: 71 BPM
EOSINOPHIL # BLD: 0.1 K/UL (ref 0–0.6)
EOSINOPHIL NFR BLD: 1.3 %
GFR SERPLBLD CREATININE-BSD FMLA CKD-EPI: >60 ML/MIN/{1.73_M2}
GLUCOSE SERPL-MCNC: 104 MG/DL (ref 70–99)
HCT VFR BLD AUTO: 41.9 % (ref 40.5–52.5)
HDLC SERPL-MCNC: 26 MG/DL (ref 40–60)
HGB BLD-MCNC: 14.6 G/DL (ref 13.5–17.5)
LDLC SERPL CALC-MCNC: 94 MG/DL
LYMPHOCYTES # BLD: 2.7 K/UL (ref 1–5.1)
LYMPHOCYTES NFR BLD: 31.5 %
MAGNESIUM SERPL-MCNC: 2.1 MG/DL (ref 1.8–2.4)
MCH RBC QN AUTO: 33.8 PG (ref 26–34)
MCHC RBC AUTO-ENTMCNC: 34.8 G/DL (ref 31–36)
MCV RBC AUTO: 97.1 FL (ref 80–100)
MONOCYTES # BLD: 1.3 K/UL (ref 0–1.3)
MONOCYTES NFR BLD: 15 %
NEUTROPHILS # BLD: 4.4 K/UL (ref 1.7–7.7)
NEUTROPHILS NFR BLD: 51.5 %
PLATELET # BLD AUTO: 328 K/UL (ref 135–450)
PMV BLD AUTO: 8.1 FL (ref 5–10.5)
POTASSIUM SERPL-SCNC: 4.2 MMOL/L (ref 3.5–5.1)
RBC # BLD AUTO: 4.32 M/UL (ref 4.2–5.9)
SODIUM SERPL-SCNC: 140 MMOL/L (ref 136–145)
TRIGL SERPL-MCNC: 178 MG/DL (ref 0–150)
VLDLC SERPL CALC-MCNC: 36 MG/DL
WBC # BLD AUTO: 8.6 K/UL (ref 4–11)

## 2024-02-11 PROCEDURE — 97530 THERAPEUTIC ACTIVITIES: CPT

## 2024-02-11 PROCEDURE — 36415 COLL VENOUS BLD VENIPUNCTURE: CPT

## 2024-02-11 PROCEDURE — 97162 PT EVAL MOD COMPLEX 30 MIN: CPT

## 2024-02-11 PROCEDURE — 85025 COMPLETE CBC W/AUTO DIFF WBC: CPT

## 2024-02-11 PROCEDURE — 6370000000 HC RX 637 (ALT 250 FOR IP)

## 2024-02-11 PROCEDURE — 83735 ASSAY OF MAGNESIUM: CPT

## 2024-02-11 PROCEDURE — 6370000000 HC RX 637 (ALT 250 FOR IP): Performed by: STUDENT IN AN ORGANIZED HEALTH CARE EDUCATION/TRAINING PROGRAM

## 2024-02-11 PROCEDURE — 70551 MRI BRAIN STEM W/O DYE: CPT

## 2024-02-11 PROCEDURE — 80048 BASIC METABOLIC PNL TOTAL CA: CPT

## 2024-02-11 PROCEDURE — 99233 SBSQ HOSP IP/OBS HIGH 50: CPT | Performed by: PSYCHIATRY & NEUROLOGY

## 2024-02-11 PROCEDURE — 97166 OT EVAL MOD COMPLEX 45 MIN: CPT

## 2024-02-11 PROCEDURE — APPNB45 APP NON BILLABLE 31-45 MINUTES

## 2024-02-11 PROCEDURE — 97116 GAIT TRAINING THERAPY: CPT

## 2024-02-11 PROCEDURE — 2580000003 HC RX 258: Performed by: STUDENT IN AN ORGANIZED HEALTH CARE EDUCATION/TRAINING PROGRAM

## 2024-02-11 PROCEDURE — 81240 F2 GENE: CPT

## 2024-02-11 PROCEDURE — 80061 LIPID PANEL: CPT

## 2024-02-11 PROCEDURE — 93010 ELECTROCARDIOGRAM REPORT: CPT | Performed by: INTERNAL MEDICINE

## 2024-02-11 PROCEDURE — 83036 HEMOGLOBIN GLYCOSYLATED A1C: CPT

## 2024-02-11 PROCEDURE — 81241 F5 GENE: CPT

## 2024-02-11 PROCEDURE — 1200000000 HC SEMI PRIVATE

## 2024-02-11 PROCEDURE — 81291 MTHFR GENE: CPT

## 2024-02-11 PROCEDURE — 99223 1ST HOSP IP/OBS HIGH 75: CPT | Performed by: INTERNAL MEDICINE

## 2024-02-11 RX ORDER — CASTOR OIL AND BALSAM, PERU 788; 87 MG/G; MG/G
OINTMENT TOPICAL 2 TIMES DAILY
Status: DISCONTINUED | OUTPATIENT
Start: 2024-02-11 | End: 2024-02-14 | Stop reason: HOSPADM

## 2024-02-11 RX ADMIN — ACETAMINOPHEN 650 MG: 325 TABLET ORAL at 20:43

## 2024-02-11 RX ADMIN — ACETAMINOPHEN 650 MG: 325 TABLET ORAL at 08:46

## 2024-02-11 RX ADMIN — Medication: at 20:42

## 2024-02-11 RX ADMIN — ATORVASTATIN CALCIUM 80 MG: 80 TABLET, FILM COATED ORAL at 20:43

## 2024-02-11 RX ADMIN — ASPIRIN 81 MG 81 MG: 81 TABLET ORAL at 08:46

## 2024-02-11 RX ADMIN — SODIUM CHLORIDE, PRESERVATIVE FREE 10 ML: 5 INJECTION INTRAVENOUS at 20:42

## 2024-02-11 RX ADMIN — Medication: at 08:47

## 2024-02-11 NOTE — NURSE NAVIGATOR
Verified educational Stroke booklet in room for patient and/or family to review. Patients personal risk factors specific to stroke/TIA include: hypertension, overweight, hyperlipidemia, current smoker.    Patient's chart reviewed for Stroke Core Measures and additional needs:    [x]   VTE prophylaxis - SCDs on   [x]   Antithrombotic (if applicable) - aspirin given   [x]   Swallow screen prior to PO intake - documented pass on nursing administered swallow screen prior to PO intake   [x]   Lipids / A1C ordered or resulted - both ordered, to be drawn in AM   [x]   Therapy ordered   [x]   Care plan and Education template - added, with personalized risk factors    Navigator to continue to follow patient while admitted, to assist with follow up and discharge planning as needed.     Nurse eSignature: Electronically signed by Rosario Ortiz RN on 2/10/24 at 9:27 PM EST - Neuroscience Navigator

## 2024-02-11 NOTE — ED PROVIDER NOTES
In addition to the advanced practice provider, I personally saw Santiago Muniz and performed a substantive portion of the visit including all aspects of the medical decision making. I made/approved the management plan and take responsibility for the patient management    Briefly, this is a 58 y.o. male here for left-sided weakness.  Patient reports went to bed around midnight today feeling well, woke at 0400 to use the bathroom when he felt as though his right leg gave out on him and he fell, he did strike his head on the staircase.  Denies any loss of consciousness.  Since falling he has had difficulty moving his left arm and left leg, was unable to stand to his feet and had to crawl across the floor to summon aid.  On EMS arrival, also noted to have left-sided facial droop and a stroke alert was activated in the field.    On exam, patient afebrile and nontoxic. No distress. Heart RRR. Lungs CTAB. Abdomen soft, nondistended, nontender to palpation in all quadrants. A&Ox4. Speech clear.  Mild left-sided facial droop which resolves with effort, otherwise CN 2-12 intact.  No drift of right sided extremities.  Left upper extremity with drift to bed, left lower extremity with drift however does not hit bed.  Unable to ambulate secondary to leg weakness..          Screenings  NIH Stroke Scale  NIH Stroke Scale Assessed: Yes  Interval: Baseline  Level of Consciousness (1a): Alert  LOC Questions (1b): Answers both correctly  LOC Commands (1c): Performs both tasks correctly  Best Gaze (2): Normal  Visual (3): No visual loss  Facial Palsy (4): (!) Minor paralysis  Motor Arm, Left (5a): Drift, but does not hit bed  Motor Arm, Right (5b): No drift  Motor Leg, Left (6a): Drift, but does not hit bed  Motor Leg, Right (6b): No drift  Limb Ataxia (7): (!) Present in two limbs  Sensory (8): (!) Mild to Moderate  Best Language (9): No aphasia  Dysarthria (10): Normal  Extinction and Inattention (11): No abnormality  Total:

## 2024-02-12 PROBLEM — I63.511 CEREBROVASCULAR ACCIDENT (CVA) DUE TO OCCLUSION OF RIGHT MIDDLE CEREBRAL ARTERY (HCC): Status: ACTIVE | Noted: 2024-02-12

## 2024-02-12 LAB
ANION GAP SERPL CALCULATED.3IONS-SCNC: 12 MMOL/L (ref 3–16)
BASOPHILS # BLD: 0 K/UL (ref 0–0.2)
BASOPHILS NFR BLD: 0.4 %
BUN SERPL-MCNC: 17 MG/DL (ref 7–20)
CALCIUM SERPL-MCNC: 9 MG/DL (ref 8.3–10.6)
CHLORIDE SERPL-SCNC: 105 MMOL/L (ref 99–110)
CO2 SERPL-SCNC: 21 MMOL/L (ref 21–32)
CREAT SERPL-MCNC: 1 MG/DL (ref 0.9–1.3)
DEPRECATED RDW RBC AUTO: 12.6 % (ref 12.4–15.4)
EOSINOPHIL # BLD: 0.1 K/UL (ref 0–0.6)
EOSINOPHIL NFR BLD: 1.3 %
EST. AVERAGE GLUCOSE BLD GHB EST-MCNC: 122.6 MG/DL
FACT VIII ACT/NOR PPP: 189 % (ref 50–150)
GFR SERPLBLD CREATININE-BSD FMLA CKD-EPI: >60 ML/MIN/{1.73_M2}
GLUCOSE SERPL-MCNC: 100 MG/DL (ref 70–99)
HBA1C MFR BLD: 5.9 %
HCT VFR BLD AUTO: 44.5 % (ref 40.5–52.5)
LYMPHOCYTES # BLD: 2.8 K/UL (ref 1–5.1)
LYMPHOCYTES NFR BLD: 31.6 %
MAGNESIUM SERPL-MCNC: 2 MG/DL (ref 1.8–2.4)
MCH RBC QN AUTO: 33.3 PG (ref 26–34)
MCHC RBC AUTO-ENTMCNC: 34.4 G/DL (ref 31–36)
MCV RBC AUTO: 96.8 FL (ref 80–100)
MONOCYTES # BLD: 1.1 K/UL (ref 0–1.3)
MONOCYTES NFR BLD: 12.8 %
NEUTROPHILS # BLD: 4.8 K/UL (ref 1.7–7.7)
NEUTROPHILS NFR BLD: 53.9 %
PLATELET # BLD AUTO: 365 K/UL (ref 135–450)
PMV BLD AUTO: 8.1 FL (ref 5–10.5)
POTASSIUM SERPL-SCNC: 4 MMOL/L (ref 3.5–5.1)
RBC # BLD AUTO: 4.59 M/UL (ref 4.2–5.9)
SODIUM SERPL-SCNC: 138 MMOL/L (ref 136–145)
WBC # BLD AUTO: 8.9 K/UL (ref 4–11)

## 2024-02-12 PROCEDURE — 85730 THROMBOPLASTIN TIME PARTIAL: CPT

## 2024-02-12 PROCEDURE — 6370000000 HC RX 637 (ALT 250 FOR IP): Performed by: STUDENT IN AN ORGANIZED HEALTH CARE EDUCATION/TRAINING PROGRAM

## 2024-02-12 PROCEDURE — 99255 IP/OBS CONSLTJ NEW/EST HI 80: CPT | Performed by: INTERNAL MEDICINE

## 2024-02-12 PROCEDURE — 85306 CLOT INHIBIT PROT S FREE: CPT

## 2024-02-12 PROCEDURE — 2580000003 HC RX 258: Performed by: STUDENT IN AN ORGANIZED HEALTH CARE EDUCATION/TRAINING PROGRAM

## 2024-02-12 PROCEDURE — 6370000000 HC RX 637 (ALT 250 FOR IP)

## 2024-02-12 PROCEDURE — 97530 THERAPEUTIC ACTIVITIES: CPT

## 2024-02-12 PROCEDURE — 92523 SPEECH SOUND LANG COMPREHEN: CPT

## 2024-02-12 PROCEDURE — 85613 RUSSELL VIPER VENOM DILUTED: CPT

## 2024-02-12 PROCEDURE — 97116 GAIT TRAINING THERAPY: CPT

## 2024-02-12 PROCEDURE — 85300 ANTITHROMBIN III ACTIVITY: CPT

## 2024-02-12 PROCEDURE — 86146 BETA-2 GLYCOPROTEIN ANTIBODY: CPT

## 2024-02-12 PROCEDURE — 92610 EVALUATE SWALLOWING FUNCTION: CPT

## 2024-02-12 PROCEDURE — 97110 THERAPEUTIC EXERCISES: CPT

## 2024-02-12 PROCEDURE — 86147 CARDIOLIPIN ANTIBODY EA IG: CPT

## 2024-02-12 PROCEDURE — 2060000000 HC ICU INTERMEDIATE R&B

## 2024-02-12 PROCEDURE — 85240 CLOT FACTOR VIII AHG 1 STAGE: CPT

## 2024-02-12 PROCEDURE — 85303 CLOT INHIBIT PROT C ACTIVITY: CPT

## 2024-02-12 PROCEDURE — 85025 COMPLETE CBC W/AUTO DIFF WBC: CPT

## 2024-02-12 PROCEDURE — C8929 TTE W OR WO FOL WCON,DOPPLER: HCPCS

## 2024-02-12 PROCEDURE — 80048 BASIC METABOLIC PNL TOTAL CA: CPT

## 2024-02-12 PROCEDURE — 36415 COLL VENOUS BLD VENIPUNCTURE: CPT

## 2024-02-12 PROCEDURE — 85610 PROTHROMBIN TIME: CPT

## 2024-02-12 PROCEDURE — 85301 ANTITHROMBIN III ANTIGEN: CPT

## 2024-02-12 PROCEDURE — 6360000004 HC RX CONTRAST MEDICATION: Performed by: INTERNAL MEDICINE

## 2024-02-12 PROCEDURE — 83735 ASSAY OF MAGNESIUM: CPT

## 2024-02-12 PROCEDURE — 97535 SELF CARE MNGMENT TRAINING: CPT

## 2024-02-12 RX ADMIN — ATORVASTATIN CALCIUM 80 MG: 80 TABLET, FILM COATED ORAL at 21:48

## 2024-02-12 RX ADMIN — Medication: at 08:29

## 2024-02-12 RX ADMIN — Medication: at 21:48

## 2024-02-12 RX ADMIN — ACETAMINOPHEN 650 MG: 325 TABLET ORAL at 08:37

## 2024-02-12 RX ADMIN — PERFLUTREN 1.5 ML: 6.52 INJECTION, SUSPENSION INTRAVENOUS at 08:00

## 2024-02-12 RX ADMIN — ASPIRIN 81 MG 81 MG: 81 TABLET ORAL at 08:22

## 2024-02-12 RX ADMIN — SODIUM CHLORIDE, PRESERVATIVE FREE 10 ML: 5 INJECTION INTRAVENOUS at 21:49

## 2024-02-12 RX ADMIN — SODIUM CHLORIDE, PRESERVATIVE FREE 10 ML: 5 INJECTION INTRAVENOUS at 08:23

## 2024-02-12 NOTE — ACP (ADVANCE CARE PLANNING)
Advance Care Planning     Advance Care Planning Inpatient Note  [facility] Department    Today's Date: 2/12/2024  Unit: [facility] ICU    Received request from IDT Member.  Upon review of chart and communication with care team, patient's decision making abilities are not in question.. Patient and Child/Children was/were present in the room during visit.    Goals of ACP Conversation:  Discuss advance care planning documents    Health Care Decision Makers:       Primary Decision Maker: Chelsea Muniz - Child - [phone]  Summary:  Completed New Documents  Updated Healthcare Decision Maker  Documented Next of Kin, per patient report    Advance Care Planning Documents (Patient Wishes):  Healthcare Power of /Advance Directive Appointment of Health Care Agent  Living Will/Advance Directive     Assessment:  Writer spoke to pt's daughter via pt's phone. Offered explanation on Advanced Directives. Pt expressed understanding, said, \"I want my daughter Chelsea to make my medical decisions if I cannot make them. And if Doctors determine that there is nothing they can do to save me, let me go. I do not want to be a vegetable. I do not want to be left for too long on life support.\" New document completed.    Interventions:  Provided education on documents for clarity and greater understanding  Discussed and provided education on state decision maker hierarchy  Assisted in the completion of documents according to patient's wishes at this time    Care Preferences Communicated:   Ventilation:   If the patient, in their present state of health, suddenly became very ill and unable to breathe on their own,     the patient would desire the use of a ventilator (breathing machine).    If their health worsens and it becomes clear that the change of recovery is unlikely,     the patient would NOT desire the use of a ventilator (breathing machine).    Resuscitation:  In the event the patient's heart stopped as a result of an

## 2024-02-12 NOTE — CARE COORDINATION
Case Management Assessment  Initial Evaluation    Date/Time of Evaluation: 2/12/2024 12:51 PM  Assessment Completed by: QUIN MURGUIA    If patient is discharged prior to next notation, then this note serves as note for discharge by case management.    Patient Name: Santiago Muniz                   YOB: 1965  Diagnosis: Acute cerebrovascular accident (CVA) (HCC) [I63.9]  Ischemic stroke (HCC) [I63.9]                   Date / Time: 2/10/2024  5:54 PM    Patient Admission Status: Inpatient   Readmission Risk (Low < 19, Mod (19-27), High > 27): Readmission Risk Score: 11    Current PCP: Ramírez Worrell Jr., MD  PCP verified by CM? No (recently left PCP due to \"altercation\". PCP list provided)    Chart Reviewed: Yes      History Provided by: Patient  Patient Orientation: Alert and Oriented    Patient Cognition: Alert    Hospitalization in the last 30 days (Readmission):  No    If yes, Readmission Assessment in CM Navigator will be completed.    Advance Directives:      Code Status: Full Code   Patient's Primary Decision Maker is: Legal Next of Kin      Discharge Planning:    Patient lives with: Alone Type of Home: House  Primary Care Giver: Self  Patient Support Systems include: Children, Family Members   Current Financial resources: Medicaid  Current community resources: None  Current services prior to admission: Durable Medical Equipment            Current DME: Gideon Rushing            Type of Home Care services:  None    ADLS  Prior functional level: Independent in ADLs/IADLs  Current functional level: Assistance with the following:, Mobility    PT AM-PAC: 18 /24  OT AM-PAC: 19 /24    Family can provide assistance at DC: Yes  Would you like Case Management to discuss the discharge plan with any other family members/significant others, and if so, who? Yes (family at bedside)  Plans to Return to Present Housing: Yes  Other Identified Issues/Barriers to RETURNING to current housing:

## 2024-02-13 LAB
ANION GAP SERPL CALCULATED.3IONS-SCNC: 10 MMOL/L (ref 3–16)
BASOPHILS # BLD: 0.1 K/UL (ref 0–0.2)
BASOPHILS NFR BLD: 0.4 %
BUN SERPL-MCNC: 18 MG/DL (ref 7–20)
CALCIUM SERPL-MCNC: 9.3 MG/DL (ref 8.3–10.6)
CHLORIDE SERPL-SCNC: 102 MMOL/L (ref 99–110)
CO2 SERPL-SCNC: 24 MMOL/L (ref 21–32)
CREAT SERPL-MCNC: 1.1 MG/DL (ref 0.9–1.3)
DEPRECATED RDW RBC AUTO: 12.7 % (ref 12.4–15.4)
EOSINOPHIL # BLD: 0.2 K/UL (ref 0–0.6)
EOSINOPHIL NFR BLD: 1.7 %
GFR SERPLBLD CREATININE-BSD FMLA CKD-EPI: >60 ML/MIN/{1.73_M2}
GLUCOSE SERPL-MCNC: 104 MG/DL (ref 70–99)
HCT VFR BLD AUTO: 45.4 % (ref 40.5–52.5)
HGB BLD-MCNC: 15.3 G/DL (ref 13.5–17.5)
LYMPHOCYTES # BLD: 3.1 K/UL (ref 1–5.1)
LYMPHOCYTES NFR BLD: 21.1 %
MAGNESIUM SERPL-MCNC: 2 MG/DL (ref 1.8–2.4)
MCH RBC QN AUTO: 32.9 PG (ref 26–34)
MCHC RBC AUTO-ENTMCNC: 33.7 G/DL (ref 31–36)
MCV RBC AUTO: 97.8 FL (ref 80–100)
MONOCYTES # BLD: 1.7 K/UL (ref 0–1.3)
MONOCYTES NFR BLD: 11.7 %
NEUTROPHILS # BLD: 9.5 K/UL (ref 1.7–7.7)
NEUTROPHILS NFR BLD: 65.1 %
PLATELET # BLD AUTO: 368 K/UL (ref 135–450)
PMV BLD AUTO: 8 FL (ref 5–10.5)
POTASSIUM SERPL-SCNC: 4.5 MMOL/L (ref 3.5–5.1)
RBC # BLD AUTO: 4.64 M/UL (ref 4.2–5.9)
SODIUM SERPL-SCNC: 136 MMOL/L (ref 136–145)
WBC # BLD AUTO: 14.6 K/UL (ref 4–11)

## 2024-02-13 PROCEDURE — 6370000000 HC RX 637 (ALT 250 FOR IP): Performed by: HOSPITALIST

## 2024-02-13 PROCEDURE — 97530 THERAPEUTIC ACTIVITIES: CPT

## 2024-02-13 PROCEDURE — 6370000000 HC RX 637 (ALT 250 FOR IP): Performed by: STUDENT IN AN ORGANIZED HEALTH CARE EDUCATION/TRAINING PROGRAM

## 2024-02-13 PROCEDURE — 85025 COMPLETE CBC W/AUTO DIFF WBC: CPT

## 2024-02-13 PROCEDURE — 80048 BASIC METABOLIC PNL TOTAL CA: CPT

## 2024-02-13 PROCEDURE — 83735 ASSAY OF MAGNESIUM: CPT

## 2024-02-13 PROCEDURE — 2580000003 HC RX 258: Performed by: STUDENT IN AN ORGANIZED HEALTH CARE EDUCATION/TRAINING PROGRAM

## 2024-02-13 PROCEDURE — 97116 GAIT TRAINING THERAPY: CPT

## 2024-02-13 PROCEDURE — 2060000000 HC ICU INTERMEDIATE R&B

## 2024-02-13 PROCEDURE — 36415 COLL VENOUS BLD VENIPUNCTURE: CPT

## 2024-02-13 PROCEDURE — 6370000000 HC RX 637 (ALT 250 FOR IP)

## 2024-02-13 RX ORDER — AMLODIPINE BESYLATE 2.5 MG/1
2.5 TABLET ORAL DAILY
Status: DISCONTINUED | OUTPATIENT
Start: 2024-02-13 | End: 2024-02-14 | Stop reason: HOSPADM

## 2024-02-13 RX ADMIN — ATORVASTATIN CALCIUM 80 MG: 80 TABLET, FILM COATED ORAL at 20:11

## 2024-02-13 RX ADMIN — SODIUM CHLORIDE, PRESERVATIVE FREE 10 ML: 5 INJECTION INTRAVENOUS at 20:12

## 2024-02-13 RX ADMIN — Medication: at 08:31

## 2024-02-13 RX ADMIN — ACETAMINOPHEN 650 MG: 325 TABLET ORAL at 20:11

## 2024-02-13 RX ADMIN — Medication: at 20:15

## 2024-02-13 RX ADMIN — ASPIRIN 81 MG 81 MG: 81 TABLET ORAL at 08:31

## 2024-02-13 RX ADMIN — AMLODIPINE BESYLATE 2.5 MG: 2.5 TABLET ORAL at 12:07

## 2024-02-13 RX ADMIN — SODIUM CHLORIDE, PRESERVATIVE FREE 10 ML: 5 INJECTION INTRAVENOUS at 08:35

## 2024-02-13 RX ADMIN — ACETAMINOPHEN 650 MG: 325 TABLET ORAL at 04:18

## 2024-02-13 NOTE — DISCHARGE INSTRUCTIONS
STROKE FOLLOW UP CLINIC    Clinic address:   [address]  Phone number for scheduling appt:   [phone]  Hours:   8:00 a.m. to 4:30 p.m.  Monday - Friday          My Clinic appointment is:      Date:_3/05/2024____ Time:_11:00 AM______        Please notify the Clinic if you need to cancel your appointment.      Secondary Stroke Prevention Goals:      Blood Pressure:  Goal - BP < 140/90  Take your medication as prescribed.   Take your blood pressure at home regularly (at least once a day for the first few weeks). Write the numbers down so your primary care provider can adjust medications as needed.      Cholesterol:   Continue to take your cholesterol medication to help prevent a stroke.     Smoking Cessation   If you are a current smoker the goal is completely quitting.      Diabetes Mellitus:   Goal - HbA1c < 7%, your A1c was 5.9%     Alcohol Consumption:  Men - two or fewer drinks per day     Physical Activity:  Goal - at least 30 minutes of moderate intensity physical exercise 1-3 times per week  Okay to start at any level and work your way up to goal. Walking even 5-10 minutes a day is better than no walking. Starting with small goals and working your way up is the best way to be successful.   Visit this web page for more information on moving more and living healthy:   https://www.heart.org/en/healthy-living     Diet:  Low-fat, low-sodium, and Mediterranean or Dietary Approaches to Stop Hypertension (DASH) or Diabetic Diet when applicable.   Visit this web page for more information on ways to improve your diet:   https://www.heart.org/en/healthy-living/healthy-eating/eat-smart/nutrition-basics     Obesity:  Goal BMI 18.5-25 kg/m2                                                    [facility] Stroke Program Survey  The [facility] values your feedback related to your recent hospital visit and admission. We strive to improve our Neuroscience program to promote better

## 2024-02-13 NOTE — NURSE NAVIGATOR
R Parietal lobe and insular cortex stroke; etiology suspected to be embolic per neurology.     Patient's chart reviewed for Stroke Core Measures and additional needs:    [x]   VTE prophylaxis - SCDs    [x]   Antithrombotic (if applicable) - PO aspirin, see eMAR    [x]   Swallow screen prior to PO intake   [x]   Lipids / A1C ordered or resulted - 2/11/24: LDL 94; A1C 5.9   [x]   Therapy ordered - PT AM-PAC: 18/24; OT AM-PAC: 19/24   [x]   Care plan and Education template - In progress     - MRI completed, see results   - ECHO with estimated EF 60-65%; bubble study was performed and fails to show evidence of shunting.   - Per cardiology, plan for HENRY 2/14/24 and CAM Monitor placement on discharge   - Hypercoagulable studies in process   - Plan to [facility] home with [facility]  - CM following to assist   - Stroke f/u clinic appointment scheduled, see below and AVS   - Patient requesting Meds-to-Bed on DC - bedside RN updated - reports will facilitate     Verified educational Stroke booklet in room for patient and/or family to review. Patients personal risk factors specific to stroke/TIA include: HTN, HLD, Overweight (BMI 31.17), Smoking, Family h/o heart disease (mother). Discussed personal risk factors for Stroke/TIA with patient/family, and ways to reduce the risk for a recurrent stroke.     - Nicotine patch applied, see eMAR   - High intensity statin ordered/administered, see eMAR     Advised pt. that you can reduce your risk for stroke/TIA by modifying/controlling the risk factors that you have. Pt.advised to take the medications as prescribed, which will be detailed in the discharge instructions, and to not stop taking them without consulting their physician. In addition, pt. advised to maintain a healthy diet, exercise regularly and to not smoke. Patient participated in personalized stroke education - verbalized understanding and agreeable. No family at bedside att.     [facility]'s Stroke treatment and

## 2024-02-13 NOTE — CARE COORDINATION
10:11am: spoke with pt at bedside this AM. He confirmed he will return home when able and his daughter Chelsea will be staying with him. He remains agreeable to home care and aware that [facility] is following to assist.    SW will follow    JAN Hanna   for [facility] ([facility])  Office Phone: [phone]  [employer] Mobile: [phone]

## 2024-02-14 ENCOUNTER — ANESTHESIA EVENT (OUTPATIENT)
Dept: CARDIAC CATH/INVASIVE PROCEDURES | Age: 59
DRG: 045 | End: 2024-02-14
Payer: COMMERCIAL

## 2024-02-14 ENCOUNTER — APPOINTMENT (OUTPATIENT)
Dept: VASCULAR LAB | Age: 59
DRG: 045 | End: 2024-02-14
Attending: INTERNAL MEDICINE
Payer: COMMERCIAL

## 2024-02-14 ENCOUNTER — HOSPITAL ENCOUNTER (INPATIENT)
Dept: CARDIAC CATH/INVASIVE PROCEDURES | Age: 59
Discharge: HOME | DRG: 045 | End: 2024-02-14
Attending: INTERNAL MEDICINE
Payer: COMMERCIAL

## 2024-02-14 ENCOUNTER — ANESTHESIA (OUTPATIENT)
Dept: CARDIAC CATH/INVASIVE PROCEDURES | Age: 59
DRG: 045 | End: 2024-02-14
Payer: COMMERCIAL

## 2024-02-14 ENCOUNTER — TELEPHONE (OUTPATIENT)
Dept: CARDIOLOGY | Age: 59
End: 2024-02-14

## 2024-02-14 VITALS
HEART RATE: 77 BPM | OXYGEN SATURATION: 98 % | RESPIRATION RATE: 12 BRPM | DIASTOLIC BLOOD PRESSURE: 87 MMHG | SYSTOLIC BLOOD PRESSURE: 121 MMHG

## 2024-02-14 VITALS
DIASTOLIC BLOOD PRESSURE: 72 MMHG | HEART RATE: 77 BPM | TEMPERATURE: 98.1 F | WEIGHT: 242.8 LBS | SYSTOLIC BLOOD PRESSURE: 118 MMHG | BODY MASS INDEX: 31.16 KG/M2 | RESPIRATION RATE: 18 BRPM | OXYGEN SATURATION: 100 % | HEIGHT: 74 IN

## 2024-02-14 LAB
ANION GAP SERPL CALCULATED.3IONS-SCNC: 12 MMOL/L (ref 3–16)
APTT IMM NP PPP: NORMAL SEC (ref 32–48)
APTT P HEP NEUT PPP: NORMAL SEC (ref 32–48)
AT III ACT/NOR PPP CHRO: 122 % (ref 76–128)
AT III AG ACT/NOR PPP IA: 113 % (ref 82–136)
B2 GLYCOPROT1 IGG SERPL IA-ACNC: <10 SGU
B2 GLYCOPROT1 IGM SERPL IA-ACNC: <10 SMU
BASOPHILS # BLD: 0 K/UL (ref 0–0.2)
BASOPHILS NFR BLD: 0 %
BUN SERPL-MCNC: 16 MG/DL (ref 7–20)
CALCIUM SERPL-MCNC: 9.2 MG/DL (ref 8.3–10.6)
CARDIOLIPIN IGA SER IA-ACNC: <10 APL
CARDIOLIPIN IGG SER IA-ACNC: <10 GPL
CARDIOLIPIN IGM SER IA-ACNC: <10 MPL
CHLORIDE SERPL-SCNC: 101 MMOL/L (ref 99–110)
CO2 SERPL-SCNC: 22 MMOL/L (ref 21–32)
CONFIRM APTT STACLOT: NORMAL
CREAT SERPL-MCNC: 1 MG/DL (ref 0.9–1.3)
DEPRECATED RDW RBC AUTO: 12.5 % (ref 12.4–15.4)
DRVVT SCREEN TO CONFIRM RATIO: NORMAL RATIO
EOSINOPHIL # BLD: 0 K/UL (ref 0–0.6)
EOSINOPHIL NFR BLD: 0 %
GFR SERPLBLD CREATININE-BSD FMLA CKD-EPI: >60 ML/MIN/{1.73_M2}
GLUCOSE SERPL-MCNC: 104 MG/DL (ref 70–99)
HCT VFR BLD AUTO: 41.6 % (ref 40.5–52.5)
HGB BLD-MCNC: 14.2 G/DL (ref 13.5–17.5)
LA 3 SCREEN W REFLEX-IMP: NORMAL
LYMPHOCYTES # BLD: 3.3 K/UL (ref 1–5.1)
LYMPHOCYTES NFR BLD: 20 %
MAGNESIUM SERPL-MCNC: 1.8 MG/DL (ref 1.8–2.4)
MCH RBC QN AUTO: 33 PG (ref 26–34)
MCHC RBC AUTO-ENTMCNC: 34.1 G/DL (ref 31–36)
MCV RBC AUTO: 96.7 FL (ref 80–100)
MIXING DRVVT: NORMAL SEC (ref 33–44)
MONOCYTES # BLD: 1.3 K/UL (ref 0–1.3)
MONOCYTES NFR BLD: 8 %
NEUTROPHILS # BLD: 11.7 K/UL (ref 1.7–7.7)
NEUTROPHILS NFR BLD: 72 %
PLATELET # BLD AUTO: 343 K/UL (ref 135–450)
PMV BLD AUTO: 8.1 FL (ref 5–10.5)
POTASSIUM SERPL-SCNC: 4 MMOL/L (ref 3.5–5.1)
PROCALCITONIN SERPL IA-MCNC: 0.11 NG/ML (ref 0–0.15)
PROT C ACT/NOR PPP: 117 % (ref 83–168)
PROT S ACT/NOR PPP: 111 % (ref 66–143)
PROTHROMBIN TIME: 12.1 SEC (ref 12–15.5)
RBC # BLD AUTO: 4.3 M/UL (ref 4.2–5.9)
RBC MORPH BLD: NORMAL
REPTILASE TIME: NORMAL SEC
SCREEN APTT: 39 SEC (ref 32–48)
SCREEN DRVVT: 38 SEC (ref 33–44)
SODIUM SERPL-SCNC: 135 MMOL/L (ref 136–145)
THROMBIN TIME: NORMAL SEC (ref 14.7–19.5)
WBC # BLD AUTO: 16.3 K/UL (ref 4–11)

## 2024-02-14 PROCEDURE — 6370000000 HC RX 637 (ALT 250 FOR IP): Performed by: STUDENT IN AN ORGANIZED HEALTH CARE EDUCATION/TRAINING PROGRAM

## 2024-02-14 PROCEDURE — 6370000000 HC RX 637 (ALT 250 FOR IP): Performed by: NURSE PRACTITIONER

## 2024-02-14 PROCEDURE — 85025 COMPLETE CBC W/AUTO DIFF WBC: CPT

## 2024-02-14 PROCEDURE — 83735 ASSAY OF MAGNESIUM: CPT

## 2024-02-14 PROCEDURE — 80048 BASIC METABOLIC PNL TOTAL CA: CPT

## 2024-02-14 PROCEDURE — 2580000003 HC RX 258: Performed by: HOSPITALIST

## 2024-02-14 PROCEDURE — 3700000001 HC ADD 15 MINUTES (ANESTHESIA): Performed by: ANESTHESIOLOGY

## 2024-02-14 PROCEDURE — 93971 EXTREMITY STUDY: CPT

## 2024-02-14 PROCEDURE — 93312 ECHO TRANSESOPHAGEAL: CPT

## 2024-02-14 PROCEDURE — 93325 DOPPLER ECHO COLOR FLOW MAPG: CPT

## 2024-02-14 PROCEDURE — 97535 SELF CARE MNGMENT TRAINING: CPT

## 2024-02-14 PROCEDURE — 87641 MR-STAPH DNA AMP PROBE: CPT

## 2024-02-14 PROCEDURE — 99452 NTRPROF PH1/NTRNET/EHR RFRL: CPT | Performed by: INTERNAL MEDICINE

## 2024-02-14 PROCEDURE — 2580000003 HC RX 258: Performed by: NURSE ANESTHETIST, CERTIFIED REGISTERED

## 2024-02-14 PROCEDURE — 6360000002 HC RX W HCPCS: Performed by: HOSPITALIST

## 2024-02-14 PROCEDURE — 93320 DOPPLER ECHO COMPLETE: CPT

## 2024-02-14 PROCEDURE — 3700000000 HC ANESTHESIA ATTENDED CARE: Performed by: ANESTHESIOLOGY

## 2024-02-14 PROCEDURE — 2580000003 HC RX 258: Performed by: STUDENT IN AN ORGANIZED HEALTH CARE EDUCATION/TRAINING PROGRAM

## 2024-02-14 PROCEDURE — 93246 EXT ECG>7D<15D RECORDING: CPT | Performed by: INTERNAL MEDICINE

## 2024-02-14 PROCEDURE — 36415 COLL VENOUS BLD VENIPUNCTURE: CPT

## 2024-02-14 PROCEDURE — 97530 THERAPEUTIC ACTIVITIES: CPT

## 2024-02-14 PROCEDURE — 6370000000 HC RX 637 (ALT 250 FOR IP): Performed by: HOSPITALIST

## 2024-02-14 PROCEDURE — 84145 PROCALCITONIN (PCT): CPT

## 2024-02-14 PROCEDURE — 6360000002 HC RX W HCPCS: Performed by: NURSE ANESTHETIST, CERTIFIED REGISTERED

## 2024-02-14 RX ORDER — AMLODIPINE BESYLATE 2.5 MG/1
2.5 TABLET ORAL DAILY
Qty: 30 TABLET | Refills: 3 | Status: SHIPPED | OUTPATIENT
Start: 2024-02-15

## 2024-02-14 RX ORDER — IBUPROFEN 200 MG
TABLET ORAL 2 TIMES DAILY
Status: DISCONTINUED | OUTPATIENT
Start: 2024-02-14 | End: 2024-02-14 | Stop reason: HOSPADM

## 2024-02-14 RX ORDER — ATORVASTATIN CALCIUM 80 MG/1
80 TABLET, FILM COATED ORAL NIGHTLY
Qty: 30 TABLET | Refills: 3 | Status: SHIPPED | OUTPATIENT
Start: 2024-02-14

## 2024-02-14 RX ORDER — SODIUM CHLORIDE 9 MG/ML
INJECTION, SOLUTION INTRAVENOUS CONTINUOUS PRN
Status: DISCONTINUED | OUTPATIENT
Start: 2024-02-14 | End: 2024-02-14 | Stop reason: SDUPTHER

## 2024-02-14 RX ORDER — NICOTINE 21 MG/24HR
1 PATCH, TRANSDERMAL 24 HOURS TRANSDERMAL DAILY
Qty: 42 PATCH | Refills: 0 | Status: SHIPPED | OUTPATIENT
Start: 2024-02-14 | End: 2024-03-27

## 2024-02-14 RX ORDER — PROPOFOL 10 MG/ML
INJECTION, EMULSION INTRAVENOUS PRN
Status: DISCONTINUED | OUTPATIENT
Start: 2024-02-14 | End: 2024-02-14 | Stop reason: SDUPTHER

## 2024-02-14 RX ORDER — ASPIRIN 81 MG/1
81 TABLET, CHEWABLE ORAL DAILY
Qty: 30 TABLET | Refills: 3 | Status: SHIPPED | OUTPATIENT
Start: 2024-02-15

## 2024-02-14 RX ADMIN — AMLODIPINE BESYLATE 2.5 MG: 2.5 TABLET ORAL at 11:59

## 2024-02-14 RX ADMIN — BACITRACIN ZINC, NEOMYCIN, POLYMYXIN B SULFAT: 5000; 3.5; 4 OINTMENT TOPICAL at 08:10

## 2024-02-14 RX ADMIN — PROPOFOL 100 MG: 10 INJECTION, EMULSION INTRAVENOUS at 10:25

## 2024-02-14 RX ADMIN — PROPOFOL 100 MG: 10 INJECTION, EMULSION INTRAVENOUS at 10:17

## 2024-02-14 RX ADMIN — ASPIRIN 81 MG 81 MG: 81 TABLET ORAL at 11:59

## 2024-02-14 RX ADMIN — SODIUM CHLORIDE: 9 INJECTION, SOLUTION INTRAVENOUS at 10:06

## 2024-02-14 RX ADMIN — VANCOMYCIN HYDROCHLORIDE 2500 MG: 1 INJECTION, POWDER, LYOPHILIZED, FOR SOLUTION INTRAVENOUS at 12:35

## 2024-02-14 RX ADMIN — PROPOFOL 120 MG: 10 INJECTION, EMULSION INTRAVENOUS at 10:33

## 2024-02-14 RX ADMIN — Medication: at 08:10

## 2024-02-14 RX ADMIN — SODIUM CHLORIDE, PRESERVATIVE FREE 10 ML: 5 INJECTION INTRAVENOUS at 12:36

## 2024-02-14 NOTE — ANESTHESIA POSTPROCEDURE EVALUATION
Department of Anesthesiology  Postprocedure Note    Patient: Santiago Muniz  MRN: [id number]  YOB: 1965  Date of evaluation: 2/14/2024    Procedure Summary       Date: 02/14/24 Room / Location: [facility]    Anesthesia Start: 1014 Anesthesia Stop: 1041    Procedure: [facility]ENRY W ECHO AND ANES Diagnosis:     Scheduled Providers: Milton Charles MD Responsible Provider: Milton Charles MD    Anesthesia Type: MAC ASA Status: 3            Anesthesia Type: No value filed.    Noemy Phase I:      Noemy Phase II:      Anesthesia Post Evaluation    Patient location during evaluation: bedside  Level of consciousness: awake and alert  Airway patency: patent  Nausea & Vomiting: no vomiting  Cardiovascular status: blood pressure returned to baseline  Respiratory status: acceptable  Hydration status: euvolemic  Multimodal analgesia pain management approach  Pain management: adequate    No notable events documented.

## 2024-02-14 NOTE — CONSULTS
[facility] -  Clinical Pharmacy Note    Vancomycin - Management by Pharmacy    Consult Date(s): 2/14/24  Consulting Provider(s): Dr. Ulloa    Assessment / Plan  Skin/Soft Tissue Infection - Vancomycin  Concurrent Antimicrobials: N/A  Day of Vanc Therapy / Ordered Duration: day #1 / 7  Current Dosing Method: Bayesian-Guided AUC Dosing  Therapeutic Goal: -600 mg/L*hr  Current Dose / Plan:   SCr 1.0, appears baseline.   Will give a 2500 mg IV x1 loading dose this AM, followed by vancomycin 1250 mg IV q12h.   Kinetics predict AUC = 467 mg/L*h and ssVt = 13.4 mg/L.   A random level has been ordered for tomorrow with AM labs to assess above kinetics.   Will continue to monitor clinical condition and make adjustments to regimen as appropriate.    Thank you for consulting pharmacy,    Derek Dodge, PharmD, [facility]  [id number]  02/14/24 8:09 AM       Interval update:  WBC ct increasing 14.6--> 16.3. Tmax is 99.8 in past 24 hours. Starting on vancomycin. MRSA nares pending.     Subjective/Objective:   Santiago Muniz is a 58 y.o. male with a PMHx significant for HTN, HLD, ADD who is admitted with L sided weakness and an acute R MCA occlusion. Patient now is being treated for skin infection.    Pharmacy is consulted to dose vancomycin.    Ht Readings from Last 1 Encounters:   02/10/24 1.88 m (6' 2\")     Wt Readings from Last 1 Encounters:   02/12/24 110.1 kg (242 lb 12.8 oz)     Current & Prior Antimicrobial Regimen(s):  Vancomycin  2500 mg IV x1 (2/14)  1250 mg IV q12h (2/14 - current)    Vancomycin Level(s) / Doses:    Date Time Dose Type of Level / Level Interpretation   2/15 0600 1250 mg IV q12h Random = ordered            Note: Serum levels collected for AUC-based dosing may be high if collected in close proximity to the dose administered. This is not necessarily indicative of toxicity.    Cultures & Sensitivities:    Date Site Micro Susceptibility / Result   2/14 MRSA nares Ordered             Recent Labs     
(Patient not taking: Reported on 6/14/2022) 40 capsule 0    meloxicam (MOBIC) 15 MG tablet Take 15 mg by mouth daily. (Patient not taking: Reported on 6/14/2022)      pregabalin (LYRICA) 150 MG capsule Take 150 mg by mouth 2 times daily. (Patient not taking: Reported on 6/14/2022)      ibuprofen (ADVIL;MOTRIN) 800 MG tablet Take 1 tablet by mouth every 6 hours as needed for Pain. (Patient not taking: Reported on 6/14/2022) 40 tablet 0    celecoxib (CELEBREX) 100 MG capsule Take 100 mg by mouth daily. (Patient not taking: Reported on 6/14/2022)      clonazePAM (KLONOPIN) 1 MG tablet Take 1 mg by mouth 2 times daily as needed for Anxiety. (Patient not taking: Reported on 6/14/2022)      amphetamine-dextroamphetamine (ADDERALL, 10MG,) 10 MG tablet Take 10 mg by mouth daily. 10-15 mg two times a day (Patient not taking: Reported on 6/14/2022)      ibuprofen (IBU) 600 MG tablet Take 1 tablet by mouth every 8 hours as needed for Pain for 20 doses. (Patient not taking: Reported on 6/14/2022) 20 tablet 0         Scheduled Meds:   balsum peru-castor oil   Topical BID    sodium chloride flush  5-40 mL IntraVENous 2 times per day    atorvastatin  80 mg Oral Nightly    aspirin  81 mg Oral Daily    Or    aspirin  300 mg Rectal Daily    nicotine  1 patch TransDERmal Daily      Continuous Infusions:   sodium chloride       PRN Meds:sodium chloride flush, sodium chloride, ondansetron **OR** ondansetron, polyethylene glycol, labetalol, acetaminophen    Allergies: No Known Allergies    REVIEW OF SYSTEMS:       History obtained from the patient    Review of Systems   HENT:  Negative for ear pain.    Eyes:  Negative for visual disturbance.   Respiratory:  Negative for shortness of breath.    Cardiovascular:  Negative for chest pain.   Gastrointestinal:  Negative for nausea and vomiting.   Neurological:  Positive for headaches (unachnged from yesterday). Negative for dizziness and light-headedness.       PHYSICAL EXAM:       Vitals: BP 
below.    CONSTITUTIONAL: No fatigue  SKIN: No rash or pruritis.  ENT: No Headaches, hearing loss or vertigo. No mouth sores or sore throat.  CARDIOVASCULAR: No chest pain/chest pressure/chest discomfort. No palpitations. No edema.   RESPIRATORY: No dyspnea. No cough or wheezing, no sputum production.  GASTROINTESTINAL: No N/V/D. No abdominal pain, appetite loss, blood in stools.  GENITOURINARY: No dysuria, trouble voiding, or hematuria.  MUSCULOSKELETAL:  No gait disturbance, weakness or joint complaints.  NEUROLOGICAL: Left-sided weakness  ENDOCRINE: No excessive thirst, fluid intake, or urination. No tremor.  HEMATOLOGIC: No abnormal bruising or bleeding.  ALLERGY: No nasal congestion or hives.      Physical Examination:     Vitals:    02/12/24 0400 02/12/24 0600 02/12/24 0807 02/12/24 1113   BP: 124/81  (!) 133/98 101/77   Pulse: 64 70 67 81   Resp: 20  16 16   Temp: 97.1 °F (36.2 °C)  97.9 °F (36.6 °C)    TempSrc: Oral  Oral    SpO2: 91%  92% 96%   Weight: 110.1 kg (242 lb 12.8 oz)      Height:           Wt Readings from Last 3 Encounters:   02/12/24 110.1 kg (242 lb 12.8 oz)   02/10/24 109.3 kg (241 lb)   02/10/24 109.4 kg (241 lb 1.6 oz)         General Appearance:  Alert, cooperative, no distress, appears stated age, appropriate weight   Head:  Normocephalic, without obvious abnormality, atraumatic   Neck: Supple, symmetrical, trachea midline, no adenopathy, thyroid: not enlarged, symmetric, no tenderness/mass/nodules, no carotid bruit.        Lungs:   Respirations unlabored, clear to auscultation bilaterally, without any wheezes, rubs or ronchi.    Chest Wall:  No tenderness or deformity   Heart:  Regular rhythm, rate is controlled, S1, S2 normal, there is no murmur, there is no rub or gallop, cannot assess jvd, no bilateral lower extremity edema   Abdomen:   Soft, non-tender, bowel sounds active all four quadrants,  no masses, no organomegaly       Extremities: Extremities normal, atraumatic, no cyanosis.  
simple, two step and cross body commands. No overt dysarthria. No aphasia.   GCS: 15  NIH: 4 (extinction, loss of sensation on left, drift in LUE and LLE)    Cranial nerves:   CN2: Visual fields full w/o extinction on confrontational testing   CN 3,4,6: Pupils equal and reactive to light, extraocular muscles intact  CN5: Facial sensation symmetric   CN7: ?mild left facial weakness   CN8: Hearing symmetric to spoken voice  CN9: Palate elevated symmetrically  CN11: Traps full strength on shoulder shrug  CN12: Tongue midline with protrusion    Motor Exam:   R  L    Deltoid 5 3   Biceps 5 4   Triceps 5 3   Wrist extension  5 4   Interossei 5 4      R  L    Hip flexion  5 4   Hip extension  5 4   Knee flexion  5 4   Knee extension  5 4   Ankle dorsiflexion  5 4   Ankle plantar flexion  5 4       Deep tendon reflexes:    R  L    Biceps  2  2+        Brachioradialis  2 2+   Patellar  2 2+               Sensory: can only feel deep pressure in LUE and LLE.  + sensory extinction on bilateral simultaneous stimulation  Cerebellar/coordination: finger nose finger normal without ataxia  Tone: normal in all 4 extremities  Gait: deferred at this time    Diagnostic Testing Results   IMAGES:  Images personally reviewed and agree w/ radiology interpretation of the following images.    Head CT w/o Contrast: 2/10/24; 13:19  Acute infarct within the right parietal lobe, with probable occlusion of an  M2 branch of the right MCA.        CTA of Head / Neck w/ Contrast: 2/10/24; 13:22  Presence of an acute M2 occlusion of the right MCA.     LABS:  All results below personally reviewed. Pertinent positives & negatives are addressed in Impression & Recommendations below.     LABS   Metabolic Panel Recent Labs     02/10/24  1342      K 4.1      CO2 20*   BUN 12   CREATININE 0.9   GLUCOSE 99   CALCIUM 8.6   LABALBU 3.7   ALKPHOS 94   ALT 24   AST 17      CBC / Coags Recent Labs     02/10/24  1342   WBC 11.3*   RBC 4.23   HGB 13.7

## 2024-02-14 NOTE — PROGRESS NOTES
Speech Language Pathology  Facility/Department:[facility] ICU  Clinical Swallow and Cognitive-Linguistic Evaluation  Name: Santiago Muniz  : 1965  MRN: [id number]                                                         Patient Diagnosis(es):   Patient Active Problem List    Diagnosis Date Noted    Primary hypertension 2024    Smoking 2024    Cerebrovascular accident (CVA) due to embolism of right middle cerebral artery (HCC) 02/10/2024    Chronic pain disorder 10/29/2019    Degenerative arthritis of lumbar spine 10/29/2019    Fibromyositis 10/29/2019    Lumbar radiculopathy, right 10/29/2019    Trochanteric bursitis of right hip 02/10/2017       Past Medical History:   Diagnosis Date    ADD (attention deficit disorder)     Anxiety     Hyperlipidemia     Hypertension      Past Surgical History:   Procedure Laterality Date    CARPAL TUNNEL RELEASE      KNEE SURGERY         Reason for Referral:  Santiago Muniz  was referred for a Speech Therapy evaluation to assess swallow function and/or communication.    History of Present Illness  Per MD notes:  \"Santiago Muniz is a 58 y.o. male with past medical history of hypertension, hyperlipidemia and anxiety who presents ED with complaint of weakness to his left arm and left leg.  Patient arrives by [facility].  He was stroke alerted in the field.  He reports he went to bed last night at midnight.  He woke up at 4 AM to go to the bathroom.  He states after he got to the bathroom he was walking upstairs when he states he tripped.  He fell and was unable to get up after he fell because he states his legs were weak.  He denies any blood thinners.  He crawled around on the ground and then she was able to reach the phone and called EMS who brought him to the ED for further evaluation and treatment.  He is complaining of weakness to his left arm and left leg with some feelings of tingling to his left arm.  He denies any headache, 
       NEUROCRITICAL CARE PROGRESS NOTE       Patient Name: Santiago Muniz YOB: 1965   Sex: Male Age: 58 yrs     CC / Reason for Consult: Acute R MCA occlusion    Changes over last 24 hours:   Patient doing better today, still slightly weak on LUE otherwise walked well w/ therapy today (per patient)  ECHO completed - waiting on read    ROS: L sided weakness and numbness, slightly improved today    ASSESSMENT & RECOMMENDATIONS   Assessment:  Santiago is a 58 year old male with a significant history of smoking (1PPD), hypertension and hyperlipidemia who presents with acute onset of Left hemiplegia and left facial weakness and fall down stairs without LOC. He was found to have a Right M2 occlusion on CTA and a resulting ischemic stroke seen in the right parietal lobe on CT & MRI. Stroke does appear embolic.     RECOMMENDATIONS:  Imaging / Labs:  -MRI Brain completed, reviewed  -Echocardiogram with bubble - completed - waiting on read  -LDL 94, Triglycerides 178, and A1c in process  -Hypercoag w/u sent this morning     Medications:  -High-intensity statin   -Continue ASA 81mg  -SCDs for DVT prophylaxis, ok for chemoprophylaxis if indicated     Consults / Nursing Care:  -HOB elevated to help prevent aspiration  -Neuro checks Q4H  -NIHSS Q shift  -Telemetry   -PT/OT/SLP  -PMR consult if rehab recommended   -Blood Pressure Management              - Patient's diastolic BP elevated this AM, but overall systolic autoregulating around 120 - 130. Would begin to aim for normotension.     Follow up / Discharge Recommendations:  -cardiology consulted for possible HENRY and loop monitor placement  -He will need f/u w/ nicki in 3 months, f/u w/ stroke clinic for testing results      ARVIND Cole - [facility]   Neurocritical Care   2/12/2024 10:41 AM  Perfect[employer]: [facility] Neurocritical Care  HISTORY   Interval History: MRI Brain showed R parietal stroke    PMH Past Medical History:   Diagnosis Date    ADD (attention 
       NEUROCRITICAL CARE PROGRESS NOTE       Patient Name: Santiago Muniz YOB: 1965   Sex: Male Age: 58 yrs     CC / Reason for Consult: Acute R MCA occlusion    Changes over last 24 hours:   Sitting up in chair, reporting he is able to use his L hand a bit better today, wanting to walk in halls  ECHO completed - EF 60-65% and negative bubble study, cardiology planning on HENRY tomorrow    ROS: L sided weakness and numbness, slightly improved today    ASSESSMENT & RECOMMENDATIONS   Assessment:  Santiago is a 58 year old male with a significant history of smoking (1PPD), hypertension and hyperlipidemia who presents with acute onset of Left hemiplegia and left facial weakness and fall down stairs without LOC. He was found to have a Right M2 occlusion on CTA and a resulting ischemic stroke seen in the right parietal lobe on CT & MRI. Stroke does appear embolic.     RECOMMENDATIONS:  Imaging / Labs:  -MRI Brain completed, reviewed  -Echocardiogram with bubble - completed - bubble study negative, EF 60 - 65%  -LDL 94, Triglycerides 178, and A1c 5.9  -Hypercoag w/u in process, Factor VIII elevated     Medications:  -Continue High-intensity statin   -Continue ASA 81mg  -SCDs for DVT prophylaxis, ok for chemoprophylaxis if indicated     Consults / Nursing Care:  -HOB elevated to help prevent aspiration  -Neuro checks Q4H  -NIHSS Q shift  -Telemetry   -PT/OT/SLP  -PMR consult if rehab recommended   -Blood Pressure Management              - Patient's diastolic BP elevated at times, but overall systolic autoregulating around 110 - 130. Would begin to aim for normotension.     Follow up / Discharge Recommendations:  -cardiology following, planning for HENRY on Wednesday followed by CAM monitor placement  -He will need f/u w/ nicki in 3 months, f/u w/ stroke clinic for testing results      ARVIND Mccabe - CNP   Neurocritical Care   2/13/2024 8:44 AM  [employer]: [facility] Neurocritical Care  HISTORY   Interval 
    Progress Note  Admit Date: 2/10/2024           CC: F/U for stroke    57 yo M with HTN, HLD, ADD who presented to the ED at [place] with L sided weakness.     He was getting up to go to the bathroom around 0400 in the morning and experienced weakness in his legs and L arm that caused him to fall down the steps. Pt then called EMS.     In [place] ED, pt was hypertensive with BP in 150s/90s. VS otherwise stable. Presented with L upper and lower extremity weakness. CT head and CTA head and neck demonstrated acute R M2 occlusion. Was outside of the window to receive TNK.     ED provider  discussed with  stroke team: not a candidate for consideration of lytics as he presented >3.5h from waking up; neuro-interventional team felt that, given his low NIHSS coupled with the increased risk associated with going after a more distal occlusion, EVT would not be worth the risk.    Transferred here for further eval and management.      Interval History:     No acute events overnight.     No new neuro s/s    C/o some headache    MRI showed acute infarct of right parietal and insula.      Review of Systems - Negative except  as in HPI.     Scheduled Medications:    balsum peru-castor oil   Topical BID    sodium chloride flush  5-40 mL IntraVENous 2 times per day    atorvastatin  80 mg Oral Nightly    aspirin  81 mg Oral Daily    Or    aspirin  300 mg Rectal Daily    nicotine  1 patch TransDERmal Daily      PRN Medications: sodium chloride flush, sodium chloride, ondansetron **OR** ondansetron, polyethylene glycol, labetalol, acetaminophen  Diet: ADULT DIET; Regular    Continuous Infusions:   sodium chloride         PHYSICAL EXAM:  BP (!) 128/94   Pulse 75   Temp 98 °F (36.7 °C) (Temporal)   Resp 11   Ht 1.88 m (6' 2\")   Wt 109.3 kg (241 lb)   SpO2 93%   BMI 30.94 kg/m²   Recent Labs     02/10/24  1338   POCGLU 105*     No intake or output data in the 24 hours ending 02/11/24 1232    Constitutional:       General: He 
    Progress Note  Admit Date: 2/10/2024           CC: F/U for stroke    59 yo M with HTN, HLD, ADD who presented to the ED at [place] with L sided weakness.     He was getting up to go to the bathroom around 0400 in the morning and experienced weakness in his legs and L arm that caused him to fall down the steps. Pt then called EMS.     In [place] ED, pt was hypertensive with BP in 150s/90s. VS otherwise stable. Presented with L upper and lower extremity weakness. CT head and CTA head and neck demonstrated acute R M2 occlusion. Was outside of the window to receive TNK.     ED provider  discussed with  stroke team: not a candidate for consideration of lytics as he presented >3.5h from waking up; neuro-interventional team felt that, given his low NIHSS coupled with the increased risk associated with going after a more distal occlusion, EVT would not be worth the risk.    Transferred here for further eval and management.      Interval History:     No acute events overnight.     No new neuro s/s    MRI showed acute infarct of right parietal and insula.      Review of Systems - Negative except  as in HPI.     Scheduled Medications:    balsum peru-castor oil   Topical BID    sodium chloride flush  5-40 mL IntraVENous 2 times per day    atorvastatin  80 mg Oral Nightly    aspirin  81 mg Oral Daily    Or    aspirin  300 mg Rectal Daily    nicotine  1 patch TransDERmal Daily      PRN Medications: sodium chloride flush, sodium chloride, ondansetron **OR** ondansetron, polyethylene glycol, labetalol, acetaminophen  Diet: ADULT DIET; Regular  Diet NPO    Continuous Infusions:   sodium chloride         PHYSICAL EXAM:  BP (!) 133/91   Pulse 67   Temp 97.9 °F (36.6 °C) (Oral)   Resp 16   Ht 1.88 m (6' 2\")   Wt 110.1 kg (242 lb 12.8 oz)   SpO2 97%   BMI 31.17 kg/m²   Recent Labs     02/10/24  1338   POCGLU 105*         Intake/Output Summary (Last 24 hours) at 2/12/2024 1651  Last data filed at 2/12/2024 0807  Gross 
    V2.0    [facility] Progress Note      Name:  Santiago Muniz /Age/Sex: 1965  (58 y.o. male)   MRN & CSN:  [id number] & [id number] Encounter Date/Time: 2024 10:07 AM EST   Location:  [facility]- PCP: Ramírez Worrell Jr., MD     Attending:Travis Ulloa MD       Hospital Day: 5    Assessment and Recommendations   59 yo M with HTN, HLD, ADD who presented to the ED at [place] with L sided weakness.      He was getting up to go to the bathroom around 0400 in the morning and experienced weakness in his legs and L arm that caused him to fall down the steps. Pt then called EMS.     In [place] ED, pt was hypertensive with BP in 150s/90s. VS otherwise stable. Presented with L upper and lower extremity weakness. CT head and CTA head and neck demonstrated acute R M2 occlusion. Was outside of the window to receive TNK.     ED provider  discussed with  stroke team: not a candidate for consideration of lytics as he presented >3.5h from waking up; neuro-interventional team felt that, given his low NIHSS coupled with the increased risk associated with going after a more distal occlusion, EVT would not be worth the risk.     Transferred here for further eval and management.    MRI showed acute infarct of right parietal lobe and insula.    Echo with bubble study: negative for ASD, VSD, PFO.      HbA1C 5.9     Lipid panel LDL 94      Hypercoag w/u : ordered 24 per neurology. Cardiology consulted for possible HENRY and loop monitor placement. Cardiology plans 2-week CAM monitor after completion of HENRY on Wednesday (reportedly no anaesthesia till then)    Acute right hemispheric CVA with acute M2 occlusion, right MCA    -PT/OT  -ASA 81mg; atorvastatin 80mg   -HENRY today was ok     LUE SVT - seen on doppler. He is symptomatic and possibly complicated by cellulitis. I discussed with neurology and waiting on their recommendations if ok to start AC. Cont vancomycin for now as he has hx of MRSA infection. Check 
  Current NIHSS 1    Nursing Core Measures for Stroke:   [x]   Education template documentation (STROKE/TIA). Please select only risk factors that are applicable to patient when selecting risk factors.  [x]   Care Plan template documentation (Physiologic Instability - Neurosensory). Selecting this will add care plan rows to the flowsheet under the Neuro section of Head to Toe.  [x]   Verified Swallow Screen completed prior to PO intake of food, drink, medications>          Please verify correct medication route prior to administration for intubated patients, patients who can not swallow or have alternative routes of intake (NG, OG, PA), etc  [x]   VTE Prophylaxis: SCDs ordered/addressed; SCDs: On           (As a reminder, ASA, Plavix, and TPA/TNK are not VTE prophylaxis.)    Reviewed the Following Education with Patient and/or Family:   - Personalized risk factors for patient, along with changes, modifications that will help prevent stroke.  - Signs and Symptoms of Stroke: (Facial droop, weakness/numbness especially on one side, speech difficulty, sudden confusion, sudden loss of vision, sudden severe headache, sudden loss of balance or having difficulty walking, syncope, or seizure)  - How to activate EMS (911)   - Importance of Follow Up Appointments at Discharge   - Importance of Compliance with Medications Prescribed at Discharge  - Available community resources and stroke advocacy groups if needed    Patient and/or family member: verbalized understanding.     Stroke Education booklet given to patient/family (or verified, if given already), which reviews above information. yes         Electronically signed by Aidan Cm RN on 2/11/2024 at 9:52 PM    
  Current NIHSS 7    Nursing Core Measures for Stroke:   [x]   Education template documentation (STROKE/TIA). Please select only risk factors that are applicable to patient when selecting risk factors.  [x]   Care Plan template documentation (Physiologic Instability - Neurosensory). Selecting this will add care plan rows to the flowsheet under the Neuro section of Head to Toe.  [x]   Verified Swallow Screen completed prior to PO intake of food, drink, medications  [x]   VTE Prophylaxis: SCDs ordered/addressed; SCDs: On           (As a reminder, ASA, Plavix, and TPA/TNK are not VTE prophylaxis.)    Reviewed the Following Education with Patient and/or Family:   - Personalized risk factors for patient, along with changes, modifications that will help prevent stroke.  - Signs and Symptoms of Stroke: (Facial droop, weakness/numbness especially on one side, speech difficulty, sudden confusion, sudden loss of vision, sudden severe headache, sudden loss of balance or having difficulty walking, syncope, or seizure)  - How to activate EMS (911)   - Importance of Follow Up Appointments at Discharge   - Importance of Compliance with Medications Prescribed at Discharge  - Available community resources and stroke advocacy groups if needed    Patient and/or family member: education needs reinforcement.     Stroke Education booklet given to patient/family (or verified, if given already), which reviews above information. yes         Electronically signed by Laurita Montoya RN on 2/10/2024 at 7:26 PM     
  Current NIHSS 7    Nursing Core Measures for Stroke:   [x]   Education template documentation (STROKE/TIA). Please select only risk factors that are applicable to patient when selecting risk factors.  [x]   Care Plan template documentation (Physiologic Instability - Neurosensory). Selecting this will add care plan rows to the flowsheet under the Neuro section of Head to Toe.  [x]   Verified Swallow Screen completed prior to PO intake of food, drink, medications>          Please verify correct medication route prior to administration for intubated patients, patients who can not swallow or have alternative routes of intake (NG, OG, AL), etc  [x]   VTE Prophylaxis: SCDs ordered/addressed; SCDs: On           (As a reminder, ASA, Plavix, and TPA/TNK are not VTE prophylaxis.)    Reviewed the Following Education with Patient and/or Family:   - Personalized risk factors for patient, along with changes, modifications that will help prevent stroke.  - Signs and Symptoms of Stroke: (Facial droop, weakness/numbness especially on one side, speech difficulty, sudden confusion, sudden loss of vision, sudden severe headache, sudden loss of balance or having difficulty walking, syncope, or seizure)  - How to activate EMS (911)   - Importance of Follow Up Appointments at Discharge   - Importance of Compliance with Medications Prescribed at Discharge  - Available community resources and stroke advocacy groups if needed    Patient and/or family member: verbalized understanding.     Stroke Education booklet given to patient/family (or verified, if given already), which reviews above information. yes         Electronically signed by Martha Lechuga RN on 2/10/2024 at 10:50 PM   
4 Eyes Skin Assessment     NAME:  Santiago Muniz  YOB: 1965  MEDICAL RECORD NUMBER:  [id number]    The patient is being assessed for  Admission    I agree that at least one RN has performed a thorough Head to Toe Skin Assessment on the patient. ALL assessment sites listed below have been assessed.      Areas assessed by both nurses:    Head, Face, Ears, Shoulders, Back, Chest, Arms, Elbows, Hands, Sacrum. Buttock, Coccyx, Ischium, Legs. Feet and Heels, and Under Medical Devices         Does the Patient have a Wound? No noted wound(s)       Luke Prevention initiated by RN: Yes  Wound Care Orders initiated by RN: Yes    Pressure Injury (Stage 3,4, Unstageable, DTI, NWPT, and Complex wounds) if present, place Wound referral order by RN under : No    New Ostomies, if present place, Ostomy referral order under : No     Nurse 1 eSignature: Electronically signed by Laurita Montoya RN on 2/10/24 at 7:25 PM EST    **SHARE this note so that the co-signing nurse can place an eSignature**    Nurse 2 eSignature: Electronically signed by Shanna Enriquez RN on 2/10/24 at 7:30 PM EST  
APRN notified by RN of purulent drainage from left A/C area, site of previous IV  -afebrile  -begin BID wound care with triple antibiotic ointment and dry dressing after cleansing with soap and water  -further based on re-eval by attending in AM    ARVIND Maldonado - NP   
Clinical Pharmacy Progress Note  Medication History     Admit Date: 2/10/2024    List of of current medications patient is taking is complete. Home Medication list in EPIC updated to reflect changes noted below.    Source of information: interview with patient at bedside    Patient's home pharmacy: None     Changes made to medication list:   Medications removed:   Amlodipine 10mg daily  Adderall 10mg BID  Celebrex 100mg daily  Keflex - from 2019  Clonazepam 1mg BID prn  Ibuprofen prn  Meloxicam 15mg daily  Pregabalin 150mg BID  Valsartan 160mg daily  Other notes:   Confirmed with patient that it has been several years since he has taken routine Rx medications.  Denies any routine OTC medications.        No current outpatient medications      Please call with any questions.  Omayra OlivoD., BCPS   2/12/2024 3:37 PM  Wireless: [phone]        
EMR was reviewed.  Patient had a HENRY today with anesthesia.  Overall normal heart, no intracavitary clots, no PFO or other shunts.  Patient was found to have an infiltrated left upper extremity peripheral IV and will be receiving IV antibiotics.    -Will plan to place a 2-week CAM monitor when patient is ready to be discharged home.  Please call our cardiology navigator to place the monitor.    There are no further recommendations at this time and hence I will now sign off. Patient may follow up in our clinic in 3 weeks once discharged from the hospital for further cardiac care.     Thank you for the consult and please do not hesitate to contact me with any questions.     Rodney Yung MD, FACC, [facility]  [facility] [place] [address]  Ph: [phone]  Fax: [phone]        
Occupational Therapy  Facility/Department: [address]  Daily Treatment Note  NAME: Santiago Muniz  : 1965  MRN: [id number]    Date of Service: 2024    Discharge Recommendations:  24 hour supervision or assist  OT Equipment Recommendations  Equipment Needed: No      Patient Diagnosis(es): There were no encounter diagnoses.     Assessment    Assessment: Pt progressing well demonstrating functional mobility and transfers with Supervision. Pt able to grasp all items without dropping or knocking items over on tray table or on sink during ADLs.  Pt educated on safe home set up for return home. Pt planning on discharging home with daughter staying for several days. Pt would benefit from 24hr assist upon discharge.  Activity Tolerance: Patient tolerated treatment well  Discharge Recommendations: 24 hour supervision or assist  Equipment Needed: No      Plan   Occupational Therapy Plan  Times Per Week: 5-7  Times Per Day: Once a day  Current Treatment Recommendations: Functional mobility training;Endurance training;Safety education & training;Self-Care / ADL          Subjective   Subjective  Subjective: Pt met walking down hallway with cup of coffee. Pt agreeable to OT session. Pt stating \"I am feeling pretty good\"  Orientation  Overall Orientation Status: Within Functional Limits  Orientation Level: Oriented X4  Cognition  Overall Cognitive Status: WNL        Objective         Bed Mobility Training  Bed Mobility Training: No  Transfer Training  Transfer Training: Yes  Overall Level of Assistance: Supervision (pt transfering to and from recliner chair and toilet with Supervision)  Sit to Stand: Supervision  Stand to Sit: Supervision  Bed to Chair: Supervision  Toilet Transfer: Supervision (without use of GB)  Gait  Gait Training: Yes  Overall Level of Assistance: Supervision (Pt demonstrating functional mobility in room and hallway with Supervision and slight increased lateral sway)   
Occupational Therapy  Facility/Department: [facility] ICU  Occupational Therapy Initial Assessment & Treatment    Name: Santiago Muniz  : 1965  MRN: [id number]  Date of Service: 2024    Discharge Recommendations:  24 hour supervision or assist  OT Equipment Recommendations  Equipment Needed: No       Patient Diagnosis(es): There were no encounter diagnoses.  Past Medical History:  has a past medical history of ADD (attention deficit disorder), Anxiety, Hyperlipidemia, and Hypertension.  Past Surgical History:  has a past surgical history that includes knee surgery () and Carpal tunnel release.    Treatment Diagnosis: impaired ADL and fxl mobilty      Assessment   Performance deficits / Impairments: Decreased functional mobility ;Decreased ADL status;Decreased endurance  Assessment: Pt is 58y M from home alone, hospitalized following fall down steps - MRI shows stroke in R parietal lobe and insula. Pt reporting at onset LUE/LLE weak \"dead\", and that for the most part symptoms have resolved; on examination some drift and relative weakness LUE. Pt is IND at baseline w/ ADL and fxl mobilty, helping w/ grandchildren; presently pt is CGA for ADL and fxl mobilty tasks. Pt planning d/c to home, and states has family / friends avbl for 24hr at d/c. Pt may benefit from skilled OT while inpt a to maximize safety. Will follow as inpt per POC  Treatment Diagnosis: impaired ADL and fxl mobilty  Prognosis: Good  Decision Making: Medium Complexity  REQUIRES OT FOLLOW-UP: Yes  Activity Tolerance  Activity Tolerance: Patient Tolerated treatment well;Patient limited by fatigue        Based upon information gathered in this visit, the patient's preadmission Modified Trigg Score (mRS) is: 0- No symptoms at all     Plan   Occupational Therapy Plan  Times Per Week: 5-7  Times Per Day: Once a day  Current Treatment Recommendations: Functional mobility training, Endurance training, Safety education & training, Self-Care / ADL 
Patient is alert and oriented to person, time, place, and situation. Updated patient and family on plan of care for this evening into tomorrow. During admission process, patient indicated that he would like someone to come and discuss advanced directives with himself and his daughter, Chelsea.     Vital signs currently stable.    Exam notable for mild dysarthria and mild L sided facial droop, drift in L-sided extremities, and extremely limited sensation in L-sided extremities.    Patient was transferred to a specialty bed due to his height.     MRI screening form completed.     Call light and belongings within reach. Daughter at bedside. No additional needs identified at this time. Safety measures in place.   
Patient to MRI at this time  
Pt admitted to room 4511. VSS on RA. 4 eye assessment completed. [facility] currently 7. Handoff report given to LATONYA Gama.   
This RN received handoff report at 2300. Pt refused bed alarm. RN assessed orthostatic vitals. Pt is ortho neg. RN educated pt on use of call light and to call for any assistance if need. Bed is in the lowest position, wheels are locked, bedside table and call light in reach.    
taking, \"ran out few months ago\":   - BP has been ok . Not on meds currently . 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 He woke up at night due to Lt sided chest pain.       Review of Systems:      Pertinent positives and negatives discussed in HPI    Objective:     Intake/Output Summary (Last 24 hours) at 2/13/2024 1007  Last data filed at 2/13/2024 0411  Gross per 24 hour   Intake 600 ml   Output --   Net 600 ml      Vitals:   Vitals:    02/12/24 2333 02/13/24 0411 02/13/24 0418 02/13/24 0826   BP: (!) 117/96 (!) 113/91  112/87   Pulse: 71 80  77   Resp: 16 21  21   Temp: 98.3 °F (36.8 °C) 98 °F (36.7 °C)  97.8 °F (36.6 °C)   TempSrc: Oral Oral  Oral   SpO2: 94%  100% 96%   Weight:       Height:             Physical Exam:    General: NAD  Eyes: EOMI  ENT: neck supple  Cardiovascular: Regular rate.  Respiratory: Clear to auscultation  Gastrointestinal: Soft, non tender  Genitourinary: no suprapubic tenderness  Musculoskeletal: No edema  Skin: warm, dry  Neuro: Lt sided weakness .  Psych: Mood appropriate.         Medications:   Medications:    balsum peru-castor oil   Topical BID    sodium chloride flush  5-40 mL IntraVENous 2 times per day    atorvastatin  80 mg Oral Nightly    aspirin  81 mg Oral Daily    Or    aspirin  300 mg Rectal Daily    nicotine  1 patch TransDERmal Daily      Infusions:    sodium chloride       PRN Meds: sodium chloride flush, 5-40 mL, PRN  sodium chloride, , PRN  ondansetron, 4 mg, Q8H PRN   Or  ondansetron, 4 mg, Q6H PRN  polyethylene glycol, 17 g, 
understanding    Safety Devices in Place:  CA activated, call light in reach and all needs met    Assessment:   Pt progressing with POC, improving with sit to stand transfers and demonstrating improved L UE coordination. Pt was able to tolerate strengthening exercises today with improved L UE strength and bimanual coordination. Pt progressing with functional mobility and improving with balance while walking. Would benefit from outpatient OT upon discharge, and recommend 24 hr assistance upon discharge home.     Equipment Needs:  possibly shower chair    Timed Code Treatment Minutes:    Individual Concurrent Group Co-treatment   Time In 1325         Time Out 1403         Minutes 38           Timed Code Treatment Minutes:  38 mins total     Goals:  Short Term Goals  Time Frame for Short Term Goals: by d/c  Short Term Goal 1: Pt will complete grooming tasks in stance at sink SUPV- goal met progressing to mod I  Short Term Goal 2: Pt will complete LB dressing SUPV- goal met progressing to mod I  Short Term Goal 3: Pt will complete toileting, incl clothing mgt - hygiene - transfer, SUPV- goal met progressing to mod I         Plan:      Times Per Week: 5-7   Times Per Day: Once a day    If patient is discharged prior to next treatment, this note will serve as the discharge summary.  Olivia Britt, OTR/L #[id number]    
Abdomen is without distention   Extremities: Upper and lower extremities are atraumatic in appearance without deformity. No swelling or erythema     I spent 40 minutes in the care of this patient.  Over 50% of that time was in face-to-face counseling regarding disease process, diagnostic testing, preventative measures, and answering patient and family questions.   
Goals: d/c  Short Term Goal 1: sup<>sit supervision  Short Term Goal 2: sit<>stand supervision  Short Term Goal 3: amb 150' supervision  Short Term Goal 4: ascend/descend 8 stairs with HR and supervision  Patient Goals   Patient Goals : return home       Education  Patient Education  Education Given To: Patient  Education Provided: Role of Therapy;Transfer Training  Education Method: Demonstration  Barriers to Learning: None  Education Outcome: Verbalized understanding    Therapy Time   Individual Concurrent Group Co-treatment   Time In 0844         Time Out 0924         Minutes 40         Timed Code Treatment Minutes:  25    Total Treatment Minutes:  40    If the patient is discharged before the next treatment session, this note will serve as the discharge summary.     John Julio, PT, DPT       
IND  Short Term Goal 3: amb 150' supervision  ongoing  - met/revised 2/13 amb 150ft IND  Short Term Goal 4: ascend/descend 8 stairs with HR and supervision  ongoing  Patient Goals   Patient Goals : return home       Education  Patient Education  Education Given To: Patient  Education Provided: Role of Therapy;Transfer Training;Plan of Care  Education Method: Demonstration;Verbal  Barriers to Learning: None  Education Outcome: Verbalized understanding;Demonstrated understanding;Continued education needed      Therapy Time   Individual Concurrent Group Co-treatment   Time In 1337         Time Out 1415         Minutes 38             Timed Code Treatment Minutes: 38    Total Treatment Minutes: 38      Nikita Potts, SPT       Therapist was present, directed the patient's care, made skilled judgment, and was responsible for assessment and treatment of the patient.  Katherin Brandt PT, DPT, NCS, CSRS     
Minutes:  23    If patient is discharged prior to next treatment, this note will serve as the discharge summary.  Gloria Ray, PT, DPT  [id number]

## 2024-02-14 NOTE — PROGRESS NOTES
Cath Lab Pre Procedure Flowsheet    Plan of Care:     Hemodynamics and cardiac rhythm will remain stable.   Comfort level will be maintained.   Respiratory function will remain adequate.   Pt/family will verbalize understanding of the procedure.   Procedure will be tolerated without complications.   Patient will recover from procedure without complications.   ID armband on patient and identification verified.   Informed consent obtained.   Non invasive blood pressure cuff applied, monitoring initiated.   EKG pads and pulse oximeter applied, monitoring initiated.   Instructions given. Patient and / or family verbalize understanding.   H&P will be documented by physician in [facility].     Pre-procedure:    NPO Status: Pt has been NPO since midnight. .    Contrast / IV Dye Allergy:     Pregnancy Test: N/A.    Prep Sites: Wrist(s)  Chest    Homero's Test:    Pulses:     Anticoagulants: None.     Antiplatelets: None.     Chief Complaint:      Diabetic: No    Pre EKG Rhythm: nsr    Pre SBP:120/89    IV access:right f/a    Pre-procedure blood work collected by:     NIH Scale:

## 2024-02-14 NOTE — DISCHARGE SUMMARY
dural venous sinus thrombosis on this non-dedicated study. BRAIN: See separately dictated noncontrast head CT report.     CTA confirms presence of an acute M2 occlusion of the right MCA. Incidentally noted pulmonary emphysema and bilateral parotid gland nodules/masses the differential diagnosis for which includes, but is not limited to, Warthin's tumors.     CT HEAD WO CONTRAST    Result Date: 2/10/2024  EXAMINATION: CT OF THE HEAD WITHOUT CONTRAST  2/10/2024 1:16 pm TECHNIQUE: CT of the head was performed without the administration of intravenous contrast. Automated exposure control, iterative reconstruction, and/or weight based adjustment of the mA/kV was utilized to reduce the radiation dose to as low as reasonably achievable. COMPARISON: None. HISTORY: ORDERING SYSTEM PROVIDED HISTORY: fall - left side weakness TECHNOLOGIST PROVIDED HISTORY: Reason for exam:->fall - left side weakness Has a \"code stroke\" or \"stroke alert\" been called?->Yes Decision Support Exception - unselect if not a suspected or confirmed emergency medical condition->Emergency Medical Condition (MA) Reason for Exam: stroke symptoms, facial droop, left sided weakness FINDINGS: BRAIN/VENTRICLES: There is gray-white differentiation loss within the right parietal lobe extending into the parietal operculum and posterior insula consistent with an acute infarct.  No bleed or shift is identified.  There is a hyperdense vessel seen within the right sylvian fissure extending superiorly, worrisome for an M2 branch occlusion of the MCA. ORBITS: The visualized portion of the orbits demonstrate no acute abnormality. SINUSES: The visualized paranasal sinuses and mastoid air cells demonstrate no acute abnormality. SOFT TISSUES/SKULL:  No acute abnormality of the visualized skull or soft tissues.     Acute infarct within the right parietal lobe, with probable occlusion of an M2 branch of the right MCA. Findings were discussed with MD Effie at 1:34 pm on

## 2024-02-14 NOTE — TELEPHONE ENCOUNTER
Per Dr Yung, 2 week CAM for stroke.   2 Week CAM ID #: [id number] - [id number] placed. Written and verbal instructions given to patient; verb understanding.

## 2024-02-14 NOTE — CARE COORDINATION
Addendum at 1:45pm:          Case Management Assessment            Discharge Note                    Date / Time of Note: 2/14/2024 1:44 PM                  Discharge Note Completed by: JAN Hanna   for [facility] ([facility])  Office Phone: [phone]  [employer] Mobile: [phone]    Patient Name: Santiago Muniz   YOB: 1965  Diagnosis: Acute cerebrovascular accident (CVA) (HCC) [I63.9]  Ischemic stroke (HCC) [I63.9]   Date / Time: 2/10/2024  5:54 PM    Current PCP: Ramírez Worrell Jr., MD  Clinic patient: No    Hospitalization in the last 30 days: No       Advance Directives:  Code Status: Full Code  [place] DNR form completed and on chart: Not Indicated    Financial:  Payor: [employer] / Plan: [facility] MEDICAID / Product Type: *No Product type* /      Pharmacy:    [id number] - [place], [place] - [address] - P [phone] - F [phone]  [address]  [facility] [address]  Phone: [phone] Fax: [phone]      Assistance purchasing medications?: Potential Assistance Purchasing Medications: No  Assistance provided by Case Management: None at this time    Does patient want to participate in local refill/ meds to beds program?:      Meds To Beds General Rules:  1. Can ONLY be done Monday- Friday between 8:30am-5pm  2. Prescription(s) must be in pharmacy by 3pm to be filled same day  3.Copy of patient's insurance/ prescription drug card and patient face sheet must be sent along with the prescription(s)  4. Cost of Rx cannot be added to hospital bill. If financial assistance is needed, please contact unit  or ;  or  CANNOT provide pharmacy voucher for patients co-pays  5. Patients can then  the prescription on their way out of the hospital at discharge, or pharmacy can deliver to the bedside if staff is available. (payment due at time of

## 2024-02-14 NOTE — PROGRESS NOTES
CATH LAB PROCEDURE LOG - TRANSESOPHAGEAL ECHOCARDIOGRAM      PRE Procedure:    Pt arrived to Cath Lab.   Plan of Care: Hemodynamics and cardiac rhythm will remain stable. Comfort level will be maintained. Respiratory function will remain adequate. Pt/family will verbalize understanding of the procedure. Procedure will be tolerated without complications. Patient will recover from procedure without complications.   ID armband on patient and identification verified.   Informed consent obtained.   Non invasive blood pressure cuff applied, monitoring initiated.   EKG pads and pulse oximeter applied, monitoring initiated.   Instructions given. Patient and / or family verbalize understanding.   H&P will be documented by physician in Epic.     Pt has been NPO since midnight.       Timeout and Fire Safety completed at 10:20 AM.     Correct patient verified.   Members of the surgical team/visitors introduced.   Allergies announced.   Correct procedure verified.   Correct procedure site verified.   Consents verified.   Implant equipment, additional services, special requirements available.   Medications labeled and available.   Appropriate pre medications have been administered.     Alcohol prep solution had sufficient time to dissipate if used. Yes=1, No=0  Surgical site or incision above the xyphoid. Yes=1, No=0  Open oxygen source. Yes=1, No=0  Available ignition source. Yes=1, No=0  Score total - 1.     Procedure Medication:          Anesthesia Sedating:  10:20 AM Anesthesia sedates patient, refer to anesthesia encounter.              Transesophageal Echocardiogram:    10:29 AM HENRY probe passed and images obtained.     10:29 AM Bubble test performed.         10:33 AM HENRY probe removed without incident.       10:42 AM Pt waking up, respirations spontaneous, able to converse appropriately, follows commands, resting quietly.       POST Procedure:     DISCHARGE TIME: 11:23 AM    Report given to floor nurse. Pt transferred to

## 2024-02-14 NOTE — PLAN OF CARE
Problem: Discharge Planning  Goal: Discharge to home or other facility with appropriate resources  2/11/2024 0841 by Laurita Montoya RN  Outcome: Progressing  Flowsheets (Taken 2/11/2024 0800)  Discharge to home or other facility with appropriate resources: Identify barriers to discharge with patient and caregiver     Problem: Neurosensory - Adult  Goal: Achieves stable or improved neurological status  2/11/2024 0841 by Laurita Montoya RN  Outcome: Progressing     Problem: Neurosensory - Adult  Goal: Absence of seizures  2/11/2024 0841 by Laurita Montoya RN  Outcome: Progressing     Problem: Neurosensory - Adult  Goal: Remains free of injury related to seizures activity  2/11/2024 0841 by Laurita Montoya RN  Outcome: Progressing     Problem: Neurosensory - Adult  Goal: Achieves maximal functionality and self care  2/11/2024 0841 by Laurita Montoya RN  Outcome: Progressing     Problem: Cardiovascular - Adult  Goal: Maintains optimal cardiac output and hemodynamic stability  2/11/2024 0841 by Laurita Montoya RN  Outcome: Progressing  Flowsheets (Taken 2/11/2024 0800)  Maintains optimal cardiac output and hemodynamic stability: Monitor blood pressure and heart rate     Problem: Cardiovascular - Adult  Goal: Absence of cardiac dysrhythmias or at baseline  2/11/2024 0841 by Laurita Montoya RN  Outcome: Progressing     Problem: Skin/Tissue Integrity - Adult  Goal: Incisions, wounds, or drain sites healing without S/S of infection  2/11/2024 0841 by Laurita Montoya RN  Outcome: Progressing     Problem: Skin/Tissue Integrity - Adult  Goal: Oral mucous membranes remain intact  2/11/2024 0841 by Laurita Montoya RN  Outcome: Progressing     Problem: Musculoskeletal - Adult  Goal: Return mobility to safest level of function  2/11/2024 0841 by Laurita Montoya RN  Outcome: Progressing  Flowsheets (Taken 2/11/2024 0800)  Return Mobility to Safest Level of Function: Assess patient stability and activity 
  Problem: Discharge Planning  Goal: Discharge to home or other facility with appropriate resources  Outcome: Completed     Problem: Neurosensory - Adult  Goal: Achieves stable or improved neurological status  Outcome: Completed     Problem: Cardiovascular - Adult  Goal: Maintains optimal cardiac output and hemodynamic stability  Outcome: Completed     Problem: Skin/Tissue Integrity - Adult  Goal: Skin integrity remains intact  Outcome: Completed     Problem: Hematologic - Adult  Goal: Maintains hematologic stability  Outcome: Completed     
  Problem: Discharge Planning  Goal: Discharge to home or other facility with appropriate resources  Outcome: Progressing  Flowsheets (Taken 2/10/2024 1800)  Discharge to home or other facility with appropriate resources:   Identify barriers to discharge with patient and caregiver   Arrange for needed discharge resources and transportation as appropriate     Problem: Neurosensory - Adult  Goal: Achieves stable or improved neurological status  Outcome: Progressing  Goal: Absence of seizures  Outcome: Progressing  Goal: Remains free of injury related to seizures activity  Outcome: Progressing  Goal: Achieves maximal functionality and self care  Outcome: Progressing     Problem: Cardiovascular - Adult  Goal: Maintains optimal cardiac output and hemodynamic stability  Outcome: Progressing  Goal: Absence of cardiac dysrhythmias or at baseline  Outcome: Progressing     Problem: Skin/Tissue Integrity - Adult  Goal: Skin integrity remains intact  Outcome: Progressing  Goal: Incisions, wounds, or drain sites healing without S/S of infection  Outcome: Progressing  Goal: Oral mucous membranes remain intact  Outcome: Progressing     Problem: Musculoskeletal - Adult  Goal: Return mobility to safest level of function  Outcome: Progressing  Goal: Maintain proper alignment of affected body part  Outcome: Progressing  Goal: Return ADL status to a safe level of function  Outcome: Progressing     Problem: Hematologic - Adult  Goal: Maintains hematologic stability  Outcome: Progressing     
  Problem: Discharge Planning  Goal: Discharge to home or other facility with appropriate resources  Outcome: Progressing  Flowsheets (Taken 2/12/2024 1757 by Priya Moore, RN)  Discharge to home or other facility with appropriate resources:   Identify barriers to discharge with patient and caregiver   Identify discharge learning needs (meds, wound care, etc)   Refer to discharge planning if patient needs post-hospital services based on physician order or complex needs related to functional status, cognitive ability or social support system   Arrange for needed discharge resources and transportation as appropriate     Problem: Neurosensory - Adult  Goal: Achieves stable or improved neurological status  2/13/2024 1659 by Maria Teresa Rodrigues, RN  Outcome: Progressing  Flowsheets (Taken 2/12/2024 1757 by Priya Moore, RN)  Achieves stable or improved neurological status:   Assess for and report changes in neurological status   Initiate measures to prevent increased intracranial pressure   Maintain blood pressure and fluid volume within ordered parameters to optimize cerebral perfusion and minimize risk of hemorrhage   Monitor temperature, glucose, and sodium. Initiate appropriate interventions as ordered     Problem: Cardiovascular - Adult  Goal: Maintains optimal cardiac output and hemodynamic stability  Outcome: Progressing  Flowsheets (Taken 2/12/2024 1757 by Priya Moore, RN)  Maintains optimal cardiac output and hemodynamic stability:   Monitor urine output and notify Licensed Independent Practitioner for values outside of normal range   Monitor blood pressure and heart rate   Assess for signs of decreased cardiac output   Administer fluid and/or volume expanders as ordered     
  Problem: Neurosensory - Adult  Goal: Achieves stable or improved neurological status  2/13/2024 0455 by Rosangela Wynne RN  Outcome: Progressing  Problem: Neurosensory - Adult  Goal: Absence of seizures  2/13/2024 0455 by Rosangela Wynne RN  Outcome: Progressing  Problem: Neurosensory - Adult  Goal: Remains free of injury related to seizures activity  2/13/2024 0455 by Rosangela Wynne RN  Outcome: Progressing  Remains free of injury related to seizure activity:   Maintain airway, patient safety  and administer oxygen as ordered   If seizure occurs, turn patient to side and suction secretions as needed   Reorient patient post seizure   Monitor patient for seizure activity, document and report duration and description of seizure to Licensed Independent Practitioner   Instruct patient/family to notify RN of any seizure activity   Instruct patient/family to call for assistance with activity based on assessment     Absence of seizures:   Monitor for seizure activity.  If seizure occurs, document type and location of movements and any associated apnea   Administer anticonvulsants as ordered   Support airway/breathing, administer oxygen as needed   If seizure occurs, turn head to side and suction secretions as needed     Achieves stable or improved neurological status:   Assess for and report changes in neurological status   Initiate measures to prevent increased intracranial pressure   Maintain blood pressure and fluid volume within ordered parameters to optimize cerebral perfusion and minimize risk of hemorrhage   Monitor temperature, glucose, and sodium. Initiate appropriate interventions as ordered     
  Problem: SLP Adult - Impaired Communication  Goal: By Discharge: Demonstrates communication skills at highest level of function for planned discharge setting.  See evaluation for individualized goals.  Outcome: Progressing     Problem: SLP Adult - Disturbed Thought Process  Goal: By Discharge: Demonstrates cognitive skills at highest level of function for planned discharge setting.   See evaluation for individualized goals.  Outcome: Progressing         Clinical swallow and cognitive-linguistic evaluation completed.   Please see EMR for full report.         Lilia Negro MA, CCC-SLP  [id number]  Speech-Language Pathologist    
Neurology Plan of Care:    Patient's chart reviewed, had HENRY this morning and no evidence of PFO or thrombus. Hypercoaguable work up is in process. To get CAM monitor placed prior to discharge. His exam has remained stable.    Discussed case with Dr. Ulloa, patient was found to have an acute superficial venous thrombosis in the L cephalic vein (without deep vein involvement). If anticoagulation is needed, it would be ok to start, but given that he is four days out from his stroke would check a head CT in the AM if this is initiated.    If anticoagulation is needed and initiated, does not need ASA from a Neurology perspective    He has follow up in the stroke clinic set for 3/5/24 and should follow up with Demetrius in 3 months.    Discussed with Dr. Ulloa, Dr. Anyi Contreras, ARVIND-CNP  Neurology & Neurocritical Care   Neurology Line: [phone]  [facility]: [facility] NPs    
Pt discharged home per Dr. Ulloa via wheelchair. Pt educated on clot in left arm. Educated on medications and when to take next dose. Explained to pt about follow up with ENT for the parotid gland and follow up appointments. Pt educated on CAM sensor and instructions.    Pt verbalized understanding of all information provided. Medications obtained from [facility] pharmacy and given to pt. All questions answered to pt satisfaction.   
RN received report from Priya. Pt brought down via wheelchair. Oriented to room and unit policies.      
hygiene regimen  Taken 2/12/2024 0400 by Aidan Cm RN  Oral Mucous Membranes Remain Intact: Assess oral mucosa and hygiene practices     Problem: Musculoskeletal - Adult  Goal: Return mobility to safest level of function  Outcome: Progressing  Flowsheets (Taken 2/12/2024 0400 by Aidan Cm RN)  Return Mobility to Safest Level of Function: Assess patient stability and activity tolerance for standing, transferring and ambulating with or without assistive devices  Goal: Maintain proper alignment of affected body part  Outcome: Progressing  Flowsheets  Taken 2/12/2024 1757 by Priya Moore RN  Maintain proper alignment of affected body part:   Support and protect limb and body alignment per provider's orders   Instruct and reinforce with patient and family use of appropriate assistive device and precautions (e.g. spinal or hip dislocation precautions)  Taken 2/12/2024 0400 by Aidan Cm RN  Maintain proper alignment of affected body part: Support and protect limb and body alignment per provider's orders  Goal: Return ADL status to a safe level of function  Outcome: Progressing  Flowsheets  Taken 2/12/2024 1757 by Priya Moore RN  Return ADL Status to a Safe Level of Function:   Administer medication as ordered   Obtain physical therapy/occupational therapy consults as needed   Assess activities of daily living deficits and provide assistive devices as needed  Taken 2/12/2024 0400 by Aidan Cm RN  Return ADL Status to a Safe Level of Function: Administer medication as ordered     Problem: Hematologic - Adult  Goal: Maintains hematologic stability  Outcome: Progressing  Flowsheets (Taken 2/12/2024 0400 by Aidan Cm RN)  Maintains hematologic stability: Assess for signs and symptoms of bleeding or hemorrhage     Problem: Skin/Tissue Integrity  Goal: Absence of new skin breakdown  Description: 1.  Monitor for areas of redness and/or skin breakdown  2.  Assess vascular access sites 
respiratory status  Taken 2/11/2024 2000  Achieves optimal ventilation and oxygenation: Assess for changes in respiratory status     Problem: ABCDS Injury Assessment  Goal: Absence of physical injury  Outcome: Progressing

## 2024-02-15 LAB
F2 C.20210G>A GENO BLD/T: NEGATIVE
MRSA DNA SPEC QL NAA+PROBE: NORMAL
MTHFR C.1298A>C GENO BLD/T: NEGATIVE
MTHFR C.677C>T GENO BLD/T: NORMAL
MTHFR GENE MUT ANL BLD/T: NORMAL
SPECIMEN SOURCE: NORMAL
SPECIMEN SOURCE: NORMAL

## 2024-02-16 LAB
F5 P.R506Q BLD/T QL: NEGATIVE
SPECIMEN SOURCE: NORMAL

## 2024-03-05 ENCOUNTER — OFFICE VISIT (OUTPATIENT)
Dept: INTERNAL MEDICINE CLINIC | Age: 59
End: 2024-03-05
Payer: COMMERCIAL

## 2024-03-05 VITALS
TEMPERATURE: 97.9 F | WEIGHT: 246 LBS | DIASTOLIC BLOOD PRESSURE: 82 MMHG | OXYGEN SATURATION: 91 % | HEIGHT: 74 IN | HEART RATE: 64 BPM | RESPIRATION RATE: 18 BRPM | BODY MASS INDEX: 31.57 KG/M2 | SYSTOLIC BLOOD PRESSURE: 126 MMHG

## 2024-03-05 VITALS — WEIGHT: 246 LBS | BODY MASS INDEX: 31.57 KG/M2 | TEMPERATURE: 97.9 F | HEIGHT: 74 IN

## 2024-03-05 DIAGNOSIS — Z12.2 SCREENING FOR LUNG CANCER: ICD-10-CM

## 2024-03-05 DIAGNOSIS — I69.30 LATE EFFECTS OF CEREBRAL ISCHEMIC STROKE: ICD-10-CM

## 2024-03-05 DIAGNOSIS — I10 PRIMARY HYPERTENSION: ICD-10-CM

## 2024-03-05 DIAGNOSIS — H51.11 BINOCULAR VISION DISORDER WITH CONVERGENCE INSUFFICIENCY: ICD-10-CM

## 2024-03-05 DIAGNOSIS — K11.8 NODULE OF PAROTID GLAND: ICD-10-CM

## 2024-03-05 DIAGNOSIS — Z09 HOSPITAL DISCHARGE FOLLOW-UP: Primary | ICD-10-CM

## 2024-03-05 DIAGNOSIS — I63.511 CEREBROVASCULAR ACCIDENT (CVA) DUE TO OCCLUSION OF RIGHT MIDDLE CEREBRAL ARTERY (HCC): Primary | ICD-10-CM

## 2024-03-05 PROCEDURE — 99213 OFFICE O/P EST LOW 20 MIN: CPT | Performed by: NURSE PRACTITIONER

## 2024-03-05 PROCEDURE — 99213 OFFICE O/P EST LOW 20 MIN: CPT | Performed by: STUDENT IN AN ORGANIZED HEALTH CARE EDUCATION/TRAINING PROGRAM

## 2024-03-05 RX ORDER — ATORVASTATIN CALCIUM 80 MG/1
80 TABLET, FILM COATED ORAL NIGHTLY
Qty: 30 TABLET | Refills: 3 | Status: SHIPPED | OUTPATIENT
Start: 2024-03-05

## 2024-03-05 RX ORDER — ASPIRIN 81 MG/1
81 TABLET, CHEWABLE ORAL DAILY
Qty: 30 TABLET | Refills: 3 | Status: SHIPPED | OUTPATIENT
Start: 2024-03-05

## 2024-03-05 RX ORDER — AMLODIPINE BESYLATE 2.5 MG/1
2.5 TABLET ORAL DAILY
Qty: 30 TABLET | Refills: 3 | Status: SHIPPED | OUTPATIENT
Start: 2024-03-05

## 2024-03-05 ASSESSMENT — PATIENT HEALTH QUESTIONNAIRE - PHQ9
SUM OF ALL RESPONSES TO PHQ9 QUESTIONS 1 & 2: 0
2. FEELING DOWN, DEPRESSED OR HOPELESS: 0
1. LITTLE INTEREST OR PLEASURE IN DOING THINGS: 0
SUM OF ALL RESPONSES TO PHQ QUESTIONS 1-9: 0

## 2024-03-05 NOTE — PROGRESS NOTES
[facility]    Santiago Muniz is a 58 y.o. male, here for evaluation of the following concerns: s/p ischemic stroke 3 weeks ago    Subjective      HPI  Patient is a 57 y/o L handed male who presented on 2/11 w/ L hemiplegia / L facial weakness. NIHSS of 4 on admission. Presented outside window for TNK, intracranial occlusion too distal for EVT. Had TTE / HENRY in hospital both of which were negative. Had cardiac event monitor for 2 weeks and has cardiology f/u tomorrow. Hypercoag w/u completed and is unremarkable. He c/o clumsiness w/ L hand and some imbalance w/ burning sensation at times on this left side, also feels he has difficulty w/ reading up close recently stating his phone looks distorted when trying to read on it, this seems to come and go. He was able to cut back on smoking to less than a pack a day (prior was smoking almost 2 packs per day). He is taking all medication as prescribed and feels his diet is good, limit sugar intake, no processed meat or fast food, trying to eat more fish. No other complaints noted.     Review of Systems  See HPI    MEDICATIONS:  Current Outpatient Medications   Medication Instructions    amLODIPine (NORVASC) 2.5 mg, Oral, DAILY    aspirin 81 mg, Oral, DAILY    atorvastatin (LIPITOR) 80 mg, Oral, NIGHTLY    nicotine (NICODERM CQ) 21 MG/24HR 1 patch, TransDERmal, DAILY           Objective      /82 (Site: Left Upper Arm, Position: Sitting, Cuff Size: Medium Adult)   Pulse 64   Temp 97.9 °F (36.6 °C) (Temporal)   Resp 18   Ht 1.88 m (6' 2\")   Wt 111.6 kg (246 lb)   SpO2 91%   BMI 31.58 kg/m²    Estimated body mass index is 31.58 kg/m² as calculated from the following:    Height as of this encounter: 1.88 m (6' 2\").    Weight as of this encounter: 111.6 kg (246 lb).  Lab Results   Component Value Date/Time    CHOL 156 02/11/2024 06:06 AM    TRIG 178 02/11/2024 06:06 AM    HDL 26 02/11/2024 06:06 AM    HDL 39 10/18/2011 07:00 AM    LDLCALC 94

## 2024-03-05 NOTE — PATIENT INSTRUCTIONS
-We will provide a referral to speech therapy for you to pursue if you are interested  -We will send refills for your aspirin and amlodipine to your pharmacy  -Please continue to follow up with cardiology regarding your cardiac monitor. This may help reveal the cause of your stroke since right now it is uncertain. If you have atrial fibrillation on the monitor you may need to be on a blood thinner medication for stroke prevention. We will also check for covid.   -We will also recommend evaluation for routine cancer screening, which includes a colonoscopy and a CT chest to evaluate for lung cancer giving smoking history. Please continue to use nicoderm and continue to cut back on smoking. There are additional treatment options for smoking cessation if you are having trouble quitting with the nicotine patches.   -Please return for follow up in 4-5 weeks

## 2024-03-05 NOTE — PROGRESS NOTES
preferable. He has family history of colon cancer, reports dad diagnosed in his 70s.   4. 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 Referral to ENT to follow up regarding this.      Return in about 4 weeks (around 4/2/2024).    The patient was staffed with teaching attending: Dr. Santiago Rivero.    An electronic signature was used to authenticate this note.    --Efrain Rush MD PGY3   Biopsy Type: H and E

## 2024-03-06 ENCOUNTER — TELEPHONE (OUTPATIENT)
Dept: CARDIOLOGY CLINIC | Age: 59
End: 2024-03-06

## 2024-03-06 ENCOUNTER — OFFICE VISIT (OUTPATIENT)
Dept: CARDIOLOGY CLINIC | Age: 59
End: 2024-03-06
Payer: COMMERCIAL

## 2024-03-06 VITALS
HEART RATE: 60 BPM | OXYGEN SATURATION: 95 % | BODY MASS INDEX: 31.97 KG/M2 | DIASTOLIC BLOOD PRESSURE: 80 MMHG | SYSTOLIC BLOOD PRESSURE: 130 MMHG | WEIGHT: 249 LBS

## 2024-03-06 DIAGNOSIS — F17.200 SMOKING: ICD-10-CM

## 2024-03-06 DIAGNOSIS — I63.411 CEREBROVASCULAR ACCIDENT (CVA) DUE TO EMBOLISM OF RIGHT MIDDLE CEREBRAL ARTERY (HCC): Primary | ICD-10-CM

## 2024-03-06 DIAGNOSIS — E78.2 MIXED HYPERLIPIDEMIA: ICD-10-CM

## 2024-03-06 DIAGNOSIS — I10 PRIMARY HYPERTENSION: ICD-10-CM

## 2024-03-06 PROCEDURE — 93246 EXT ECG>7D<15D RECORDING: CPT | Performed by: INTERNAL MEDICINE

## 2024-03-06 PROCEDURE — 1111F DSCHRG MED/CURRENT MED MERGE: CPT | Performed by: NURSE PRACTITIONER

## 2024-03-06 PROCEDURE — G8417 CALC BMI ABV UP PARAM F/U: HCPCS | Performed by: NURSE PRACTITIONER

## 2024-03-06 PROCEDURE — G8484 FLU IMMUNIZE NO ADMIN: HCPCS | Performed by: NURSE PRACTITIONER

## 2024-03-06 PROCEDURE — 3017F COLORECTAL CA SCREEN DOC REV: CPT | Performed by: NURSE PRACTITIONER

## 2024-03-06 PROCEDURE — 4004F PT TOBACCO SCREEN RCVD TLK: CPT | Performed by: NURSE PRACTITIONER

## 2024-03-06 PROCEDURE — 3075F SYST BP GE 130 - 139MM HG: CPT | Performed by: NURSE PRACTITIONER

## 2024-03-06 PROCEDURE — 99214 OFFICE O/P EST MOD 30 MIN: CPT | Performed by: NURSE PRACTITIONER

## 2024-03-06 PROCEDURE — G8427 DOCREV CUR MEDS BY ELIG CLIN: HCPCS | Performed by: NURSE PRACTITIONER

## 2024-03-06 PROCEDURE — 3079F DIAST BP 80-89 MM HG: CPT | Performed by: NURSE PRACTITIONER

## 2024-03-06 NOTE — TELEPHONE ENCOUNTER
14 day cam placed on pt to rule out a-fib. Per CG.     Pt mailed back monitor, company did not receive. This is replacement. Pt will bring in to office.     [id number]

## 2024-03-06 NOTE — PROGRESS NOTES
[facility]  [address]  [phone]    CC Follow up after 2 week CAM for CVA    HPI:  58 y.o. patient of Dr Yung with HTN, HLD and recent CVA who wore a 2 week CAM monitor to evaluate for AF. Pt mailed monitor 2/28/2024 but it has not been received by CAM and thus no results available.     He denies cp, sob, LH/dizziness, palpitations, syncope or falls. No LE edema, orthopnea, PND or early satiety. No n/v/d, fever or GI/ bleeding.     Past Medical History:   Diagnosis Date    ADD (attention deficit disorder)     Anxiety     CVA (cerebral vascular accident) (HCC) 02/10/2024    M2 occlusion of the right MCA.    Hyperlipidemia     Hypertension      Past Surgical History:   Procedure Laterality Date    CARPAL TUNNEL RELEASE      KNEE SURGERY  2006     No family history on file.  Social History     Tobacco Use    Smoking status: Every Day     Current packs/day: 1.00     Types: Cigarettes    Smokeless tobacco: Never   Substance Use Topics    Alcohol use: No    Drug use: Yes     Types: Marijuana (Weed)     Allergies:Patient has no known allergies.    Review of Systems  All 12 point review of symptoms completed. Pertinent positives identified in the HPI, all other review of symptoms negative.    Physical Exam:  /80   Pulse 60   Wt 112.9 kg (249 lb)   SpO2 95%   BMI 31.97 kg/m²    General (appearance):  No acute distress  Eyes: anicteric   Neck: soft, No JVD  Ears/Nose/Mouth/Thorat: No cyanosis  CV: RRR   Respiratory:  clear, normal effort  GI: soft, non-tender, non-distended  Skin: Warm, dry. No rashes  Neuro/Psych: Alert and oriented x 3. Appropriate behavior  Ext:  No c/c. No edema  Pulses:  2+ radial     Weight  Wt Readings from Last 3 Encounters:   03/05/24 111.6 kg (246 lb)   03/05/24 111.6 kg (246 lb)   02/12/24 110.1 kg (242 lb 12.8 oz)        CBC:   Lab Results   Component Value Date    WBC 16.3 (H) 02/14/2024    HGB 14.2 02/14/2024    HCT 41.6 02/14/2024    MCV 96.7

## 2024-03-08 ENCOUNTER — TELEPHONE (OUTPATIENT)
Dept: INTERNAL MEDICINE CLINIC | Age: 59
End: 2024-03-08

## 2024-03-08 ENCOUNTER — PATIENT MESSAGE (OUTPATIENT)
Dept: CARDIOLOGY CLINIC | Age: 59
End: 2024-03-08

## 2024-03-08 DIAGNOSIS — I48.92 ATRIAL FLUTTER, UNSPECIFIED TYPE (HCC): Primary | ICD-10-CM

## 2024-03-08 PROCEDURE — 93248 EXT ECG>7D<15D REV&INTERPJ: CPT | Performed by: INTERNAL MEDICINE

## 2024-03-08 NOTE — TELEPHONE ENCOUNTER
Spoke to a pharmacist about the Prior authorization for aspirin 81. She stated that was a mistake and is not needed.

## 2024-03-13 DIAGNOSIS — I49.8 OTHER CARDIAC ARRHYTHMIA: Primary | ICD-10-CM

## 2024-03-20 ENCOUNTER — HOSPITAL ENCOUNTER (OUTPATIENT)
Dept: NON INVASIVE DIAGNOSTICS | Age: 59
Discharge: HOME | End: 2024-03-20
Payer: COMMERCIAL

## 2024-03-20 DIAGNOSIS — I48.92 ATRIAL FLUTTER, UNSPECIFIED TYPE (HCC): ICD-10-CM

## 2024-03-20 PROCEDURE — 93017 CV STRESS TEST TRACING ONLY: CPT | Performed by: INTERNAL MEDICINE

## 2024-03-20 PROCEDURE — 3430000000 HC RX DIAGNOSTIC RADIOPHARMACEUTICAL: Performed by: INTERNAL MEDICINE

## 2024-03-20 PROCEDURE — A9502 TC99M TETROFOSMIN: HCPCS | Performed by: INTERNAL MEDICINE

## 2024-03-20 PROCEDURE — 6360000002 HC RX W HCPCS: Performed by: INTERNAL MEDICINE

## 2024-03-20 PROCEDURE — 78452 HT MUSCLE IMAGE SPECT MULT: CPT | Performed by: INTERNAL MEDICINE

## 2024-03-20 RX ORDER — REGADENOSON 0.08 MG/ML
0.4 INJECTION, SOLUTION INTRAVENOUS
OUTPATIENT
Start: 2024-03-20

## 2024-03-20 RX ORDER — REGADENOSON 0.08 MG/ML
0.4 INJECTION, SOLUTION INTRAVENOUS
Status: COMPLETED | OUTPATIENT
Start: 2024-03-20 | End: 2024-03-20

## 2024-03-20 RX ADMIN — REGADENOSON 0.4 MG: 0.08 INJECTION, SOLUTION INTRAVENOUS at 09:18

## 2024-03-20 RX ADMIN — TETROFOSMIN 10 MILLICURIE: 1.38 INJECTION, POWDER, LYOPHILIZED, FOR SOLUTION INTRAVENOUS at 07:59

## 2024-03-20 RX ADMIN — TETROFOSMIN 30 MILLICURIE: 1.38 INJECTION, POWDER, LYOPHILIZED, FOR SOLUTION INTRAVENOUS at 09:20

## 2024-03-20 NOTE — PROGRESS NOTES
Instructed on sitting Lexiscan Stress Test Procedure including possible side effects/ adverse reactions. Patient verbalizes  understanding and denies having any questions.  See Epic Cardiology.  Leena Kim RN

## 2024-03-26 ENCOUNTER — PATIENT MESSAGE (OUTPATIENT)
Dept: CARDIOLOGY CLINIC | Age: 59
End: 2024-03-26

## 2024-03-26 NOTE — TELEPHONE ENCOUNTER
Pt notified via [employer] of stress test revealing no concerns and to call to make appt if he wishes to discuss in detail with Dr Yung

## 2024-03-27 ENCOUNTER — OFFICE VISIT (OUTPATIENT)
Dept: CARDIOLOGY CLINIC | Age: 59
End: 2024-03-27
Payer: COMMERCIAL

## 2024-03-27 VITALS
BODY MASS INDEX: 32.23 KG/M2 | WEIGHT: 251 LBS | HEART RATE: 64 BPM | DIASTOLIC BLOOD PRESSURE: 76 MMHG | SYSTOLIC BLOOD PRESSURE: 110 MMHG

## 2024-03-27 DIAGNOSIS — R07.9 EXERTIONAL CHEST PAIN: ICD-10-CM

## 2024-03-27 DIAGNOSIS — I10 PRIMARY HYPERTENSION: Primary | ICD-10-CM

## 2024-03-27 PROCEDURE — G8484 FLU IMMUNIZE NO ADMIN: HCPCS | Performed by: INTERNAL MEDICINE

## 2024-03-27 PROCEDURE — 4004F PT TOBACCO SCREEN RCVD TLK: CPT | Performed by: INTERNAL MEDICINE

## 2024-03-27 PROCEDURE — 3078F DIAST BP <80 MM HG: CPT | Performed by: INTERNAL MEDICINE

## 2024-03-27 PROCEDURE — 99215 OFFICE O/P EST HI 40 MIN: CPT | Performed by: INTERNAL MEDICINE

## 2024-03-27 PROCEDURE — G8428 CUR MEDS NOT DOCUMENT: HCPCS | Performed by: INTERNAL MEDICINE

## 2024-03-27 PROCEDURE — 3074F SYST BP LT 130 MM HG: CPT | Performed by: INTERNAL MEDICINE

## 2024-03-27 PROCEDURE — 3017F COLORECTAL CA SCREEN DOC REV: CPT | Performed by: INTERNAL MEDICINE

## 2024-03-27 PROCEDURE — G8417 CALC BMI ABV UP PARAM F/U: HCPCS | Performed by: INTERNAL MEDICINE

## 2024-03-27 RX ORDER — METOPROLOL SUCCINATE 25 MG/1
25 TABLET, EXTENDED RELEASE ORAL DAILY
Qty: 90 TABLET | Refills: 3 | Status: SHIPPED | OUTPATIENT
Start: 2024-03-27

## 2024-03-27 RX ORDER — TORSEMIDE 20 MG/1
20 TABLET ORAL DAILY
Qty: 30 TABLET | Refills: 3 | Status: SHIPPED | OUTPATIENT
Start: 2024-03-27

## 2024-03-27 NOTE — PATIENT INSTRUCTIONS
Patient will be started on new medication Zarelto 20mg daily, Toprol 25mg daily and torsemide 20mg daily.  Patient verbalizes understanding of the need for treatment and education provided at today's visit. Additional education materials will be provided in the AVS.     Stop taking baby aspirin  Stop taking Amlodipine

## 2024-03-27 NOTE — PROGRESS NOTES
Cc: ischemic CVA, PAFRVR, NSVT, exertional dyspnea/chest pain.    HPI:     Santiago Muniz is a 58 y.o. male with a past medical history of smoking, HTN, HLP, ADHD, ischemic (possibly embolic) stroke, PAFRVR, NSVT.     Patient was admitted at the [facility] 2/10 - 2/14/2024 for acute ischemic stroke (right parietal lobe).  Patient had the TTE, cardiology consultation, HENRY, discharged with a 2-week CAM monitor.     TTE 2/12/2024 (images personally reviewed): Normal echo except for aortic root 4.2 cm at the sinuses of Valsalva.  Bubble study x 2 negative for ASD, VSD, PFO.    HENRY 2/14/2024: Normal with no intracavitary clots or shunts.    2-week CAM 2/14 - 2/26/2024: 45 episodes of SVT (likely A-fib RVR on personal review), 1 episode of nonsustained VT 13 beats.  Because of the nonsustained VT, patient had a Lexiscan nuclear stress test.    Terri 3/20/2024: Large inferior, inferior septal, inferior lateral and apical wall perfusion defect worse with rest images, with no associated wall motion abnormality, likely an artifact, cannot exclude old inferior MI.  LVEF 53%.    Patient returns to the clinic today for follow-up.  He reports some exertional dyspnea and chest discomfort over the last 6 months.  He has also noticed lower extremity edema x 1 day.  He has regained a significant amount of strength on his left side after stroke.      Histories     Past Medical History:   has a past medical history of ADD (attention deficit disorder), Anxiety, CVA (cerebral vascular accident) (HCC), Hyperlipidemia, and Hypertension.    Surgical History:   has a past surgical history that includes knee surgery (2006) and Carpal tunnel release.     Social History:   reports that he has been smoking cigarettes. He has never used smokeless tobacco. He reports current drug use. Drug: Marijuana (Weed). He reports that he does not drink alcohol.     Family History:  No evidence for sudden cardiac death or premature CAD      Medications:

## 2024-03-28 ENCOUNTER — APPOINTMENT (OUTPATIENT)
Dept: GENERAL RADIOLOGY | Age: 59
End: 2024-03-28
Payer: COMMERCIAL

## 2024-03-28 ENCOUNTER — HOSPITAL ENCOUNTER (EMERGENCY)
Age: 59
Discharge: HOME | End: 2024-03-28
Attending: EMERGENCY MEDICINE
Payer: COMMERCIAL

## 2024-03-28 VITALS
TEMPERATURE: 97.7 F | HEART RATE: 60 BPM | OXYGEN SATURATION: 99 % | SYSTOLIC BLOOD PRESSURE: 155 MMHG | BODY MASS INDEX: 31.84 KG/M2 | WEIGHT: 248 LBS | RESPIRATION RATE: 19 BRPM | DIASTOLIC BLOOD PRESSURE: 97 MMHG

## 2024-03-28 DIAGNOSIS — R07.9 CHEST PAIN, UNSPECIFIED TYPE: Primary | ICD-10-CM

## 2024-03-28 LAB
ALBUMIN SERPL-MCNC: 4 G/DL (ref 3.4–5)
ALBUMIN/GLOB SERPL: 1.2 {RATIO} (ref 1.1–2.2)
ALP SERPL-CCNC: 110 U/L (ref 40–129)
ALT SERPL-CCNC: 24 U/L (ref 10–40)
ANION GAP SERPL CALCULATED.3IONS-SCNC: 7 MMOL/L (ref 3–16)
AST SERPL-CCNC: 31 U/L (ref 15–37)
BASOPHILS # BLD: 0 K/UL (ref 0–0.2)
BASOPHILS NFR BLD: 0.6 %
BILIRUB SERPL-MCNC: 0.4 MG/DL (ref 0–1)
BUN SERPL-MCNC: 8 MG/DL (ref 7–20)
CALCIUM SERPL-MCNC: 9.1 MG/DL (ref 8.3–10.6)
CHLORIDE SERPL-SCNC: 106 MMOL/L (ref 99–110)
CO2 SERPL-SCNC: 26 MMOL/L (ref 21–32)
CREAT SERPL-MCNC: 1.1 MG/DL (ref 0.9–1.3)
DEPRECATED RDW RBC AUTO: 13 % (ref 12.4–15.4)
EOSINOPHIL # BLD: 0.3 K/UL (ref 0–0.6)
EOSINOPHIL NFR BLD: 3.4 %
GFR SERPLBLD CREATININE-BSD FMLA CKD-EPI: 78 ML/MIN/{1.73_M2}
GLUCOSE SERPL-MCNC: 99 MG/DL (ref 70–99)
HCT VFR BLD AUTO: 41.3 % (ref 40.5–52.5)
HGB BLD-MCNC: 14.3 G/DL (ref 13.5–17.5)
LYMPHOCYTES # BLD: 2.7 K/UL (ref 1–5.1)
LYMPHOCYTES NFR BLD: 35.6 %
MCH RBC QN AUTO: 33.4 PG (ref 26–34)
MCHC RBC AUTO-ENTMCNC: 34.6 G/DL (ref 31–36)
MCV RBC AUTO: 96.6 FL (ref 80–100)
MONOCYTES # BLD: 0.9 K/UL (ref 0–1.3)
MONOCYTES NFR BLD: 11.2 %
NEUTROPHILS # BLD: 3.7 K/UL (ref 1.7–7.7)
NEUTROPHILS NFR BLD: 49.2 %
NT-PROBNP SERPL-MCNC: 115 PG/ML (ref 0–124)
PLATELET # BLD AUTO: 312 K/UL (ref 135–450)
PMV BLD AUTO: 8.5 FL (ref 5–10.5)
POTASSIUM SERPL-SCNC: 4.3 MMOL/L (ref 3.5–5.1)
POTASSIUM SERPL-SCNC: 5.2 MMOL/L (ref 3.5–5.1)
PROT SERPL-MCNC: 7.4 G/DL (ref 6.4–8.2)
RBC # BLD AUTO: 4.27 M/UL (ref 4.2–5.9)
SODIUM SERPL-SCNC: 139 MMOL/L (ref 136–145)
TROPONIN, HIGH SENSITIVITY: 11 NG/L (ref 0–22)
TROPONIN, HIGH SENSITIVITY: 12 NG/L (ref 0–22)
WBC # BLD AUTO: 7.6 K/UL (ref 4–11)

## 2024-03-28 PROCEDURE — 85025 COMPLETE CBC W/AUTO DIFF WBC: CPT

## 2024-03-28 PROCEDURE — 36415 COLL VENOUS BLD VENIPUNCTURE: CPT

## 2024-03-28 PROCEDURE — 83880 ASSAY OF NATRIURETIC PEPTIDE: CPT

## 2024-03-28 PROCEDURE — 93005 ELECTROCARDIOGRAM TRACING: CPT | Performed by: EMERGENCY MEDICINE

## 2024-03-28 PROCEDURE — 99285 EMERGENCY DEPT VISIT HI MDM: CPT

## 2024-03-28 PROCEDURE — 84484 ASSAY OF TROPONIN QUANT: CPT

## 2024-03-28 PROCEDURE — 71045 X-RAY EXAM CHEST 1 VIEW: CPT

## 2024-03-28 PROCEDURE — 84132 ASSAY OF SERUM POTASSIUM: CPT

## 2024-03-28 PROCEDURE — 80053 COMPREHEN METABOLIC PANEL: CPT

## 2024-03-28 PROCEDURE — 6370000000 HC RX 637 (ALT 250 FOR IP): Performed by: NURSE PRACTITIONER

## 2024-03-28 RX ORDER — ASPIRIN 81 MG/1
324 TABLET, CHEWABLE ORAL ONCE
Status: COMPLETED | OUTPATIENT
Start: 2024-03-28 | End: 2024-03-28

## 2024-03-28 RX ADMIN — ASPIRIN 81 MG 324 MG: 81 TABLET ORAL at 21:15

## 2024-03-28 ASSESSMENT — ENCOUNTER SYMPTOMS
SHORTNESS OF BREATH: 0
DIARRHEA: 0
NAUSEA: 0
ABDOMINAL PAIN: 0
CHEST TIGHTNESS: 0
VOMITING: 0

## 2024-03-28 ASSESSMENT — HEART SCORE: ECG: NORMAL

## 2024-03-29 LAB
EKG ATRIAL RATE: 55 BPM
EKG DIAGNOSIS: NORMAL
EKG P AXIS: 74 DEGREES
EKG P-R INTERVAL: 138 MS
EKG Q-T INTERVAL: 438 MS
EKG QRS DURATION: 112 MS
EKG QTC CALCULATION (BAZETT): 419 MS
EKG R AXIS: 55 DEGREES
EKG T AXIS: 46 DEGREES
EKG VENTRICULAR RATE: 55 BPM

## 2024-03-29 PROCEDURE — 93248 EXT ECG>7D<15D REV&INTERPJ: CPT | Performed by: INTERNAL MEDICINE

## 2024-03-29 PROCEDURE — 93010 ELECTROCARDIOGRAM REPORT: CPT | Performed by: INTERNAL MEDICINE

## 2024-03-29 NOTE — ED PROVIDER NOTES
In addition to the advanced practice provider, I personally saw Santiago Muniz and performed a substantive portion of the visit including all aspects of the medical decision making.    Medical Decision Making  Patient seen and evaluated at bedside.  Briefly, patient presenting with chest pain which has been going on and off for the last few months.  Patient did have a stress test done a week ago which was inconclusive due to heart rate and diaphragmatic/GI attenuation.  Patient is scheduled to have CT coronary artery next week.  He did see cardiology yesterday and plan this; also started on Xarelto for paroxysmal atrial fibrillation.  Lab workup today was reviewed which was reassuring; no anemia, leukocytosis or electrolyte abnormalities (potassium mildly hemolyzed).  Troponin was within normal levels x 2.  Chest pain resolved after treatment and reexamination in the ED. Patient does feel comfortable going home at this time.  He is moderate risk for acute coronary syndrome given heart score of 4, however shared decision is making discussion utilized with patient and given that he does have reliable follow-up with CT coronary artery planned, I also feel that discharge is reasonable.  He was given strict return precautions and advised to follow-up with his primary care physician as well as cardiologist.    EKG  Sinus bradycardia with ventricular to 55 bpm.  No acute ST elevations.  Normal axis.  QTc of 419.    SEP-1  Is this patient to be included in the SEP-1 Core Measure due to severe sepsis or septic shock?   No   Exclusion criteria - the patient is NOT to be included for SEP-1 Core Measure due to:  2+ SIRS criteria are not met    Screenings           Heart Score for chest pain patients  History: Moderately Suspicious  ECG: Normal  Patient Age: > 45 and < 65 years  Risk Factors: > 3 Risk factors or history of atherosclerotic disease*  Troponin: < 1X normal limit  Heart Score Total: 4       Patient 
The Ekg interpreted by me in the absence of a cardiologist shows.  Sinus bradycardia with a ventricular rate of 55. .  Axis normal.  QTc appropriate.  No specific ST or T wave abnormality.  Compared to prior 2-, sinus bradycardia has replaced normal sinus rhythm.    I only performed EKG interpretation and was not otherwise involved in the care of this patient.       Justin Valencia MD  03/28/24 0887    
but occasionally words are mis-transcribed.)    ARVIND Cotton CNP (electronically signed)           Gloria Felton, APRN - CNP  03/28/24 0186

## 2024-03-29 NOTE — ED NOTES
Patient discharged  to home in stable condition via private car.  Discharge instructions and prescriptions reviewed with patient.   Patient verbalized understanding.   Patient advised not to drive, drink ETOH or operate machinery while taking pain medications at home.  Patient verbalized understanding.   All belongings in tow including discharge paperwork.

## 2024-04-02 ENCOUNTER — TELEPHONE (OUTPATIENT)
Dept: INTERNAL MEDICINE CLINIC | Age: 59
End: 2024-04-02

## 2024-04-02 DIAGNOSIS — Z12.2 SCREENING FOR LUNG CANCER: Primary | ICD-10-CM

## 2024-04-02 DIAGNOSIS — I49.8 OTHER SPECIFIED CARDIAC ARRHYTHMIAS: Primary | ICD-10-CM

## 2024-04-02 NOTE — TELEPHONE ENCOUNTER
CENTRAL SCHEDULING STATED PT NEED TO HAVE A LUNG SCREEN FIRST BEFORE LOW DOSE LUNG CT. A NEW ORDER IS  NEEDED LUNG SCREEN.

## 2024-04-02 NOTE — PROGRESS NOTES
Low dose CT lung scan ordered for lung cancer screening which has been addressed by his PCP on last outpatient visit. He meets screening requirements.     Isis Martines MD, PGY-2  Internal Medicine

## 2024-04-03 ENCOUNTER — PATIENT MESSAGE (OUTPATIENT)
Dept: CARDIOLOGY CLINIC | Age: 59
End: 2024-04-03

## 2024-04-03 NOTE — TELEPHONE ENCOUNTER
Spoke to the patient about having a lung screen done first before the low dose ct of the lung and notified him that it was ordered and to have both done and he stated he understood.

## 2024-04-03 NOTE — TELEPHONE ENCOUNTER
Tried to reach pt by phone-sent [employer] message with monitor results and to call office

## 2024-04-05 ENCOUNTER — TELEPHONE (OUTPATIENT)
Dept: INTERVENTIONAL RADIOLOGY/VASCULAR | Age: 59
End: 2024-04-05

## 2024-04-12 ENCOUNTER — HOSPITAL ENCOUNTER (OUTPATIENT)
Dept: CT IMAGING | Age: 59
Discharge: HOME | End: 2024-04-12
Attending: INTERNAL MEDICINE
Payer: COMMERCIAL

## 2024-04-12 VITALS
RESPIRATION RATE: 18 BRPM | HEART RATE: 66 BPM | TEMPERATURE: 98.8 F | OXYGEN SATURATION: 94 % | SYSTOLIC BLOOD PRESSURE: 134 MMHG | WEIGHT: 240 LBS | BODY MASS INDEX: 29.84 KG/M2 | DIASTOLIC BLOOD PRESSURE: 90 MMHG | HEIGHT: 75 IN

## 2024-04-12 DIAGNOSIS — I10 PRIMARY HYPERTENSION: ICD-10-CM

## 2024-04-12 DIAGNOSIS — R07.9 EXERTIONAL CHEST PAIN: ICD-10-CM

## 2024-04-12 PROCEDURE — 75574 CT ANGIO HRT W/3D IMAGE: CPT

## 2024-04-12 PROCEDURE — 6360000004 HC RX CONTRAST MEDICATION: Performed by: INTERNAL MEDICINE

## 2024-04-12 PROCEDURE — 6370000000 HC RX 637 (ALT 250 FOR IP): Performed by: RADIOLOGY

## 2024-04-12 RX ORDER — NITROGLYCERIN 0.4 MG/1
0.4 TABLET SUBLINGUAL ONCE
Status: COMPLETED | OUTPATIENT
Start: 2024-04-12 | End: 2024-04-12

## 2024-04-12 RX ADMIN — NITROGLYCERIN 0.4 MG: 0.4 TABLET, ORALLY DISINTEGRATING SUBLINGUAL at 08:37

## 2024-04-12 RX ADMIN — IOPAMIDOL 120 ML: 755 INJECTION, SOLUTION INTRAVENOUS at 08:32

## 2024-04-12 ASSESSMENT — PAIN - FUNCTIONAL ASSESSMENT: PAIN_FUNCTIONAL_ASSESSMENT: NONE - DENIES PAIN

## 2024-04-12 NOTE — PROGRESS NOTES
Pt arrives to pre procedure area in stable condition from home. Vital signs stable.  Assessment completed see flow sheet.  IV patent.  Procedure explained to pt who states understanding and questions answered.

## 2024-04-12 NOTE — PROGRESS NOTES
Procedure complete, pt tolerated well, PIV removed without complication  Pt given verbal and written discharge instructions.  Pt verbalizes understanding

## 2024-04-18 ENCOUNTER — PATIENT MESSAGE (OUTPATIENT)
Dept: CARDIOLOGY CLINIC | Age: 59
End: 2024-04-18

## 2024-05-01 ENCOUNTER — OFFICE VISIT (OUTPATIENT)
Dept: CARDIOLOGY CLINIC | Age: 59
End: 2024-05-01
Payer: COMMERCIAL

## 2024-05-01 VITALS
DIASTOLIC BLOOD PRESSURE: 90 MMHG | BODY MASS INDEX: 29.62 KG/M2 | WEIGHT: 237 LBS | HEART RATE: 69 BPM | SYSTOLIC BLOOD PRESSURE: 120 MMHG

## 2024-05-01 DIAGNOSIS — I10 PRIMARY HYPERTENSION: ICD-10-CM

## 2024-05-01 DIAGNOSIS — I10 PRIMARY HYPERTENSION: Primary | ICD-10-CM

## 2024-05-01 DIAGNOSIS — I48.91 ATRIAL FIBRILLATION WITH RAPID VENTRICULAR RESPONSE (HCC): ICD-10-CM

## 2024-05-01 LAB
BASOPHILS # BLD: 0.1 K/UL (ref 0–0.2)
BASOPHILS NFR BLD: 1.1 %
DEPRECATED RDW RBC AUTO: 13.2 % (ref 12.4–15.4)
EOSINOPHIL # BLD: 0.1 K/UL (ref 0–0.6)
EOSINOPHIL NFR BLD: 1.3 %
HCT VFR BLD AUTO: 46 % (ref 40.5–52.5)
HGB BLD-MCNC: 15.5 G/DL (ref 13.5–17.5)
LYMPHOCYTES # BLD: 2.5 K/UL (ref 1–5.1)
LYMPHOCYTES NFR BLD: 31.7 %
MCH RBC QN AUTO: 32.4 PG (ref 26–34)
MCHC RBC AUTO-ENTMCNC: 33.7 G/DL (ref 31–36)
MCV RBC AUTO: 96.3 FL (ref 80–100)
MONOCYTES # BLD: 0.8 K/UL (ref 0–1.3)
MONOCYTES NFR BLD: 9.8 %
NEUTROPHILS # BLD: 4.5 K/UL (ref 1.7–7.7)
NEUTROPHILS NFR BLD: 56.1 %
PLATELET # BLD AUTO: 302 K/UL (ref 135–450)
PMV BLD AUTO: 9.4 FL (ref 5–10.5)
RBC # BLD AUTO: 4.77 M/UL (ref 4.2–5.9)
WBC # BLD AUTO: 8 K/UL (ref 4–11)

## 2024-05-01 PROCEDURE — 3080F DIAST BP >= 90 MM HG: CPT | Performed by: INTERNAL MEDICINE

## 2024-05-01 PROCEDURE — 3074F SYST BP LT 130 MM HG: CPT | Performed by: INTERNAL MEDICINE

## 2024-05-01 PROCEDURE — G8427 DOCREV CUR MEDS BY ELIG CLIN: HCPCS | Performed by: INTERNAL MEDICINE

## 2024-05-01 PROCEDURE — 3017F COLORECTAL CA SCREEN DOC REV: CPT | Performed by: INTERNAL MEDICINE

## 2024-05-01 PROCEDURE — 99215 OFFICE O/P EST HI 40 MIN: CPT | Performed by: INTERNAL MEDICINE

## 2024-05-01 PROCEDURE — 4004F PT TOBACCO SCREEN RCVD TLK: CPT | Performed by: INTERNAL MEDICINE

## 2024-05-01 PROCEDURE — G8417 CALC BMI ABV UP PARAM F/U: HCPCS | Performed by: INTERNAL MEDICINE

## 2024-05-01 NOTE — PROGRESS NOTES
Cc: ischemic CVA, PAFRVR, NSVT, CAD.    HPI:     Santiago Muniz is a 58 y.o. male with a past medical history of smoking, HTN, HLP, ADHD, ischemic (possibly embolic) stroke, PAFRVR, NSVT, CAD.     Patient was admitted at the [facility] 2/10 - 2/14/2024 for acute ischemic stroke (right parietal lobe).  CTA head showed acute M2 occlusion of right MCA.  MRI brain showed right parietal and insular ischemic stroke. Patient had the TTE, cardiology consultation, HENRY, discharged with a 2-week CAM monitor.     TTE 2/12/2024 (images personally reviewed): Normal echo except for aortic root 4.2 cm at the sinuses of Valsalva.  Bubble study x 2 negative for ASD, VSD, PFO.    HENRY 2/14/2024: Normal with no intracavitary clots or shunts.    2-week CAM 2/14 - 2/26/2024: 45 episodes of SVT (likely A-fib RVR on personal review), 1 episode of nonsustained VT 13 beats.  Because of the nonsustained VT, patient had a Lexiscan nuclear stress test.    Terri 3/20/2024: Large inferior, inferior septal, inferior lateral and apical wall perfusion defect worse with rest images, with no associated wall motion abnormality, likely an artifact, cannot exclude old inferior MI.  LVEF 53%.    Coronary CTA 04/2024: Proximal RCA 50% stenosis otherwise diffuse mild CAD.  Total calcium score 86.    Patient returns to the clinic today for follow-up.  She reports increased fatigue since starting anticoagulation on 3/27/2024.  Otherwise he denies any other symptoms.  Hemoglobin was 14 on 03/2024.      Histories     Past Medical History:   has a past medical history of ADD (attention deficit disorder), Anxiety, CVA (cerebral vascular accident) (HCC), Hyperlipidemia, and Hypertension.    Surgical History:   has a past surgical history that includes knee surgery (2006) and Carpal tunnel release.     Social History:   reports that he has been smoking cigarettes. He has never used smokeless tobacco. He reports current drug use. Drug: Marijuana (Weed). He

## 2024-05-09 ENCOUNTER — HOSPITAL ENCOUNTER (OUTPATIENT)
Dept: CT IMAGING | Age: 59
Discharge: HOME | End: 2024-05-09
Payer: COMMERCIAL

## 2024-05-09 DIAGNOSIS — Z12.2 SCREENING FOR LUNG CANCER: ICD-10-CM

## 2024-05-09 PROCEDURE — 71250 CT THORAX DX C-: CPT

## 2024-05-18 ENCOUNTER — APPOINTMENT (OUTPATIENT)
Dept: ULTRASOUND IMAGING | Age: 59
End: 2024-05-18
Payer: COMMERCIAL

## 2024-05-18 ENCOUNTER — APPOINTMENT (OUTPATIENT)
Dept: CT IMAGING | Age: 59
End: 2024-05-18
Payer: COMMERCIAL

## 2024-05-18 ENCOUNTER — HOSPITAL ENCOUNTER (EMERGENCY)
Age: 59
Discharge: HOME | End: 2024-05-18
Payer: COMMERCIAL

## 2024-05-18 VITALS
DIASTOLIC BLOOD PRESSURE: 96 MMHG | TEMPERATURE: 98.3 F | OXYGEN SATURATION: 93 % | SYSTOLIC BLOOD PRESSURE: 159 MMHG | HEART RATE: 79 BPM | WEIGHT: 230 LBS | RESPIRATION RATE: 16 BRPM | HEIGHT: 74 IN | BODY MASS INDEX: 29.52 KG/M2

## 2024-05-18 DIAGNOSIS — N45.3 EPIDIDYMOORCHITIS: Primary | ICD-10-CM

## 2024-05-18 LAB
ALBUMIN SERPL-MCNC: 4.1 G/DL (ref 3.4–5)
ALBUMIN/GLOB SERPL: 1.1 {RATIO} (ref 1.1–2.2)
ALP SERPL-CCNC: 121 U/L (ref 40–129)
ALT SERPL-CCNC: 21 U/L (ref 10–40)
ANION GAP SERPL CALCULATED.3IONS-SCNC: 13 MMOL/L (ref 3–16)
AST SERPL-CCNC: 17 U/L (ref 15–37)
BACTERIA URNS QL MICRO: ABNORMAL /HPF
BASOPHILS # BLD: 0 K/UL (ref 0–0.2)
BASOPHILS NFR BLD: 0.3 %
BILIRUB SERPL-MCNC: 0.4 MG/DL (ref 0–1)
BILIRUB UR QL STRIP.AUTO: ABNORMAL
BUN SERPL-MCNC: 9 MG/DL (ref 7–20)
CALCIUM SERPL-MCNC: 9.3 MG/DL (ref 8.3–10.6)
CHLORIDE SERPL-SCNC: 105 MMOL/L (ref 99–110)
CLARITY UR: ABNORMAL
CO2 SERPL-SCNC: 19 MMOL/L (ref 21–32)
COLOR UR: ABNORMAL
CREAT SERPL-MCNC: 1 MG/DL (ref 0.9–1.3)
DEPRECATED RDW RBC AUTO: 13.8 % (ref 12.4–15.4)
EOSINOPHIL # BLD: 0.1 K/UL (ref 0–0.6)
EOSINOPHIL NFR BLD: 0.7 %
EPI CELLS #/AREA URNS AUTO: 2 /HPF (ref 0–5)
GFR SERPLBLD CREATININE-BSD FMLA CKD-EPI: 87 ML/MIN/{1.73_M2}
GLUCOSE SERPL-MCNC: 103 MG/DL (ref 70–99)
GLUCOSE UR STRIP.AUTO-MCNC: NEGATIVE MG/DL
HCT VFR BLD AUTO: 44.9 % (ref 40.5–52.5)
HGB BLD-MCNC: 15.4 G/DL (ref 13.5–17.5)
HGB UR QL STRIP.AUTO: NEGATIVE
HYALINE CASTS #/AREA URNS AUTO: 9 /LPF (ref 0–8)
KETONES UR STRIP.AUTO-MCNC: ABNORMAL MG/DL
LEUKOCYTE ESTERASE UR QL STRIP.AUTO: NEGATIVE
LIPASE SERPL-CCNC: 28 U/L (ref 13–60)
LYMPHOCYTES # BLD: 2.6 K/UL (ref 1–5.1)
LYMPHOCYTES NFR BLD: 23.3 %
MCH RBC QN AUTO: 33.2 PG (ref 26–34)
MCHC RBC AUTO-ENTMCNC: 34.4 G/DL (ref 31–36)
MCV RBC AUTO: 96.5 FL (ref 80–100)
MONOCYTES # BLD: 0.9 K/UL (ref 0–1.3)
MONOCYTES NFR BLD: 8.3 %
NEUTROPHILS # BLD: 7.6 K/UL (ref 1.7–7.7)
NEUTROPHILS NFR BLD: 67.4 %
NITRITE UR QL STRIP.AUTO: NEGATIVE
PH UR STRIP.AUTO: 6 [PH] (ref 5–8)
PLATELET # BLD AUTO: 269 K/UL (ref 135–450)
PMV BLD AUTO: 8.6 FL (ref 5–10.5)
POTASSIUM SERPL-SCNC: 3.9 MMOL/L (ref 3.5–5.1)
PROT SERPL-MCNC: 7.9 G/DL (ref 6.4–8.2)
PROT UR STRIP.AUTO-MCNC: 300 MG/DL
RBC # BLD AUTO: 4.65 M/UL (ref 4.2–5.9)
RBC CLUMPS #/AREA URNS AUTO: 2 /HPF (ref 0–4)
SODIUM SERPL-SCNC: 137 MMOL/L (ref 136–145)
SP GR UR STRIP.AUTO: >=1.03 (ref 1–1.03)
UA COMPLETE W REFLEX CULTURE PNL UR: ABNORMAL
UA DIPSTICK W REFLEX MICRO PNL UR: YES
URN SPEC COLLECT METH UR: ABNORMAL
UROBILINOGEN UR STRIP-ACNC: 1 E.U./DL
WBC # BLD AUTO: 11.3 K/UL (ref 4–11)
WBC #/AREA URNS AUTO: 4 /HPF (ref 0–5)

## 2024-05-18 PROCEDURE — 85025 COMPLETE CBC W/AUTO DIFF WBC: CPT

## 2024-05-18 PROCEDURE — 93975 VASCULAR STUDY: CPT

## 2024-05-18 PROCEDURE — 81001 URINALYSIS AUTO W/SCOPE: CPT

## 2024-05-18 PROCEDURE — 99285 EMERGENCY DEPT VISIT HI MDM: CPT

## 2024-05-18 PROCEDURE — 80053 COMPREHEN METABOLIC PANEL: CPT

## 2024-05-18 PROCEDURE — 6370000000 HC RX 637 (ALT 250 FOR IP): Performed by: PHYSICIAN ASSISTANT

## 2024-05-18 PROCEDURE — 87491 CHLMYD TRACH DNA AMP PROBE: CPT

## 2024-05-18 PROCEDURE — 6360000004 HC RX CONTRAST MEDICATION: Performed by: PHYSICIAN ASSISTANT

## 2024-05-18 PROCEDURE — 83690 ASSAY OF LIPASE: CPT

## 2024-05-18 PROCEDURE — 87591 N.GONORRHOEAE DNA AMP PROB: CPT

## 2024-05-18 PROCEDURE — 76870 US EXAM SCROTUM: CPT

## 2024-05-18 PROCEDURE — 74177 CT ABD & PELVIS W/CONTRAST: CPT

## 2024-05-18 RX ORDER — LEVOFLOXACIN 500 MG/1
500 TABLET, FILM COATED ORAL ONCE
Status: COMPLETED | OUTPATIENT
Start: 2024-05-18 | End: 2024-05-18

## 2024-05-18 RX ORDER — LEVOFLOXACIN 500 MG/1
500 TABLET, FILM COATED ORAL DAILY
Qty: 10 TABLET | Refills: 0 | Status: SHIPPED | OUTPATIENT
Start: 2024-05-18 | End: 2024-05-28

## 2024-05-18 RX ADMIN — IOPAMIDOL 75 ML: 755 INJECTION, SOLUTION INTRAVENOUS at 15:53

## 2024-05-18 RX ADMIN — LEVOFLOXACIN 500 MG: 500 TABLET, FILM COATED ORAL at 17:28

## 2024-05-18 ASSESSMENT — ENCOUNTER SYMPTOMS
DIARRHEA: 0
WHEEZING: 0
NAUSEA: 0
ABDOMINAL PAIN: 1
RHINORRHEA: 0
SHORTNESS OF BREATH: 0

## 2024-05-18 ASSESSMENT — PAIN SCALES - GENERAL: PAINLEVEL_OUTOF10: 8

## 2024-05-18 ASSESSMENT — PAIN - FUNCTIONAL ASSESSMENT: PAIN_FUNCTIONAL_ASSESSMENT: 0-10

## 2024-05-18 ASSESSMENT — PAIN DESCRIPTION - ORIENTATION: ORIENTATION: RIGHT

## 2024-05-18 ASSESSMENT — PAIN DESCRIPTION - LOCATION: LOCATION: GROIN

## 2024-05-18 NOTE — ED PROVIDER NOTES
[facility] EMERGENCY DEPARTMENT  EMERGENCY DEPARTMENT ENCOUNTER        Pt Name: Santiago Muniz  MRN: [id number]  Birthdate 1965  Date of evaluation: 5/18/2024  Provider: Lizbet Mcintyre PA-C  PCP: Efrain Rush MD  Note Started: 3:21 PM EDT 5/18/24      PEPE. I have evaluated this patient.        CHIEF COMPLAINT       Chief Complaint   Patient presents with    Groin Swelling     Right testicle swelling for 3-4 days        HISTORY OF PRESENT ILLNESS: 1 or more Elements     History From: patient   Limitations to history : None    Santiago Muniz is a 58 y.o. male who presents for right testicle pain and swelling for the past 3 to 4 days.  Reports pain also in the right lower quadrant of his abdomen radiating around to his right flank.  States that he is at decreased urine output.  No discharge.  No rashes.  No concern for STDs, states that he is not sexually active.  No history of abdominal surgeries.  No nausea vomiting diarrhea.  No fevers or chills.  He has no other complaints or concerns at this time.    Nursing Notes were all reviewed and agreed with or any disagreements were addressed in the HPI.    REVIEW OF SYSTEMS :      Review of Systems   Constitutional:  Negative for appetite change, chills and fever.   HENT:  Negative for congestion and rhinorrhea.    Respiratory:  Negative for cough, shortness of breath and wheezing.    Cardiovascular:  Negative for chest pain.   Gastrointestinal:  Positive for abdominal pain. Negative for diarrhea, nausea and vomiting.   Genitourinary:  Positive for difficulty urinating, flank pain, scrotal swelling and testicular pain. Negative for dysuria and hematuria.   Musculoskeletal:  Negative for neck pain and neck stiffness.   Skin:  Negative for rash.   Neurological:  Negative for headaches.       Positives and Pertinent negatives as per HPI.     SURGICAL HISTORY     Past Surgical History:   Procedure Laterality Date    CARPAL TUNNEL RELEASE       Admit vs D/C, Shared Decision Making, Pt Expectation of Test or Tx.): Urinalysis negative.  CBC CMP are relatively unremarkable, mild white count of 11.3.  CT and ultrasonography consistent with acute right epididymoorchitis.  No evidence of torsion.  Covered empirically with Levaquin, 500 twice daily x 10.  First dose given in the ED.  Encouraged follow-up with urology, contact information provided.    I see nothing that would suggest an acute abdomen at this time. Based on history, physical exam, risk factors, and tests my suspicion for bowel obstruction, incarcerated hernia, acute pancreatitis, intra-abdominal abscess, perforated viscus, diverticulitis, cholecystitis, appendicitis, testicular torsion and cardiac ischemia is very low. There is no evidence of peritonitis, sepsis or toxicity at this time. I feel the patient can be managed as an outpatient with follow-up with his family doctor in 24-48 hours. Instructions have been given for the patient to return to the emergency department for worsening of the pain, high fevers, intractable vomiting, bleeding or any other concerns.  Stable for discharge.      I am the Primary Clinician of Record.  FINAL IMPRESSION      1. Epididymoorchitis          DISPOSITION/PLAN     DISPOSITION Decision To Discharge 05/18/2024 05:15:18 PM      PATIENT REFERRED TO:  Loyd Colin MD  [address] [facility]  [facility]  [facility] [address]  [phone]    Schedule an appointment as soon as possible for a visit         DISCHARGE MEDICATIONS:  New Prescriptions    LEVOFLOXACIN (LEVAQUIN) 500 MG TABLET    Take 1 tablet by mouth daily for 10 days       DISCONTINUED MEDICATIONS:  Discontinued Medications    No medications on file              (Please note that portions of this note were completed with a voice recognition program.  Efforts were made to edit the dictations but occasionally words are mis-transcribed.)    Lizbet Mcintyre PA-C (electronically signed)

## 2024-05-21 LAB
C TRACH DNA UR QL NAA+PROBE: NEGATIVE
N GONORRHOEA DNA UR QL NAA+PROBE: NEGATIVE

## 2024-07-13 DIAGNOSIS — I63.511 CEREBROVASCULAR ACCIDENT (CVA) DUE TO OCCLUSION OF RIGHT MIDDLE CEREBRAL ARTERY (HCC): ICD-10-CM

## 2024-07-14 DIAGNOSIS — I10 PRIMARY HYPERTENSION: ICD-10-CM

## 2024-07-15 RX ORDER — AMLODIPINE BESYLATE 2.5 MG/1
2.5 TABLET ORAL DAILY
Qty: 90 TABLET | OUTPATIENT
Start: 2024-07-15

## 2024-07-15 RX ORDER — ASPIRIN 81 MG
TABLET,CHEWABLE ORAL
Qty: 30 TABLET | Refills: 3 | OUTPATIENT
Start: 2024-07-15

## 2024-07-15 RX ORDER — ATORVASTATIN CALCIUM 80 MG/1
80 TABLET, FILM COATED ORAL NIGHTLY
Qty: 30 TABLET | Refills: 5 | Status: SHIPPED | OUTPATIENT
Start: 2024-07-15

## 2024-07-15 NOTE — TELEPHONE ENCOUNTER
Requested Prescriptions     Pending Prescriptions Disp Refills    atorvastatin (LIPITOR) 80 MG tablet [Pharmacy Med Name: ATORVASTATIN 80 MG TABLET] 30 tablet 3     Sig: TAKE ONE TABLET BY MOUTH ONCE NIGHTLY    ASPIRIN LOW DOSE 81 MG chewable tablet [Pharmacy Med Name: ASPIRIN 81 MG CHEWABLE TABLET] 30 tablet 3     Sig: CHEW 1 TABLET BY MOUTH DAILY       Last Clinic Visit:  3/5/2024     Next Clinic Appointment:  7/14/2024

## 2024-07-15 NOTE — TELEPHONE ENCOUNTER
Approved refill for Lipitor 80mg.  Denied ASA refill as Dr Yung's note 5/1/24 said patient does not want to take the aspirin while on Xarelto.

## 2024-07-26 ENCOUNTER — OFFICE VISIT (OUTPATIENT)
Dept: INTERNAL MEDICINE CLINIC | Age: 59
End: 2024-07-26
Payer: COMMERCIAL

## 2024-07-26 VITALS
OXYGEN SATURATION: 94 % | RESPIRATION RATE: 18 BRPM | TEMPERATURE: 98.6 F | WEIGHT: 222.4 LBS | HEART RATE: 71 BPM | SYSTOLIC BLOOD PRESSURE: 95 MMHG | DIASTOLIC BLOOD PRESSURE: 70 MMHG | BODY MASS INDEX: 28.55 KG/M2

## 2024-07-26 DIAGNOSIS — I63.511 CEREBROVASCULAR ACCIDENT (CVA) DUE TO OCCLUSION OF RIGHT MIDDLE CEREBRAL ARTERY (HCC): ICD-10-CM

## 2024-07-26 DIAGNOSIS — I25.110 CORONARY ARTERY DISEASE WITH UNSTABLE ANGINA PECTORIS, UNSPECIFIED VESSEL OR LESION TYPE, UNSPECIFIED WHETHER NATIVE OR TRANSPLANTED HEART (HCC): Primary | ICD-10-CM

## 2024-07-26 DIAGNOSIS — Z72.0 TOBACCO ABUSE: ICD-10-CM

## 2024-07-26 DIAGNOSIS — I48.91 ATRIAL FIBRILLATION WITH RAPID VENTRICULAR RESPONSE (HCC): ICD-10-CM

## 2024-07-26 PROCEDURE — 99213 OFFICE O/P EST LOW 20 MIN: CPT

## 2024-07-26 PROCEDURE — 93005 ELECTROCARDIOGRAM TRACING: CPT

## 2024-07-26 RX ORDER — ASPIRIN 81 MG/1
81 TABLET ORAL DAILY
Qty: 90 TABLET | Refills: 0 | Status: SHIPPED | OUTPATIENT
Start: 2024-07-26

## 2024-07-26 RX ORDER — NICOTINE 21 MG/24HR
1 PATCH, TRANSDERMAL 24 HOURS TRANSDERMAL DAILY
Qty: 42 PATCH | Refills: 0 | Status: SHIPPED | OUTPATIENT
Start: 2024-07-26 | End: 2024-09-06

## 2024-07-26 NOTE — ASSESSMENT & PLAN NOTE
Continues to smoke, down to 1/3 PPD. Many PY history.  - continue trying nicotine patches  - continue trying to make cutbacks  - has prior ordered LDCT - did not complete, instructed to complete when possible

## 2024-07-26 NOTE — PROGRESS NOTES
EKG in clinic stable at baseline  - instructed to call cardiology for appointment (pre-existing relationship with Dr. Yung)  - instructed to seek attention/go to ED if experiencing worsening/lasting CP, SOB/TORRSE, left arm pain, dizziness.   Orders:  -     EKG 12 Lead - Clinic Performed; Future  -     aspirin 81 MG EC tablet; Take 1 tablet by mouth daily, Disp-90 tablet, R-0Normal  2. Tobacco abuse  Assessment & Plan:  Continues to smoke, down to 1/3 PPD. Many PY history.  - continue trying nicotine patches  - continue trying to make cutbacks  - has prior ordered LDCT - did not complete, instructed to complete when possible  Orders:  -     nicotine (NICODERM CQ) 21 MG/24HR; Place 1 patch onto the skin daily, Disp-42 patch, R-0Normal  3. Cerebrovascular accident (CVA) due to occlusion of right middle cerebral artery (HCC)  Assessment & Plan:  Stable. Prev d/c ASA due to pt preference. Has not established neurology follow up due to not accepting insurance.  - Restart ASA - counseled on importance, informed of risks and benefits given already on Xarelto  - instructed to call his insurance to see which neurologist he can see, then instructed to give us call asking for referral.   4. Atrial fibrillation with rapid ventricular response (HCC)  Assessment & Plan:  Stable, EKG in clinic NSR, continue rate control meds and OAC  - cont Xarelto  - informed about benefit > risk of adding on aspirin      Return in about 3 months (around 10/26/2024).    The patient was staffed with teaching attending: Dr. Geovany Ventura.    An electronic signature was used to authenticate this note.    --Dena Valero MD

## 2024-07-26 NOTE — PATIENT INSTRUCTIONS
Nice to see you Mr. Muniz.    Please START taking aspirin with your other medications. There is an increased risk of bleeding as you already take Xarelto (blood thinner), but given your heart and stroke history the benefits outweigh the risks.    Please call your cardiologist to be seen (Dr. Yung). I am concerned that you may be developing worsening clots in your heart vessels. The EKG we did in clinic does not suggest anything urgent requiring you to go the ED.    Please call your insurance to see which neurologist (brain doctor) you are able to see. Please call us with a name and number of the neurologist and we will give you a referral so you can make an appointment.     Please continue cutting back on your smoking. It is good to hear that you have made some improvements with the number of cigarettes you smoke.     If you experience worsening chest pain, continuous chest pain, worsening shortness of breath, worsening dizziness, worsening/lasting left arm pain - then please seek medical attention immediately and go the emergency department.

## 2024-07-26 NOTE — ASSESSMENT & PLAN NOTE
Stable. Prev d/c ASA due to pt preference. Has not established neurology follow up due to not accepting insurance.  - Restart ASA - counseled on importance, informed of risks and benefits given already on Xarelto  - instructed to call his insurance to see which neurologist he can see, then instructed to give us call asking for referral.

## 2024-07-26 NOTE — ASSESSMENT & PLAN NOTE
Stable, EKG in clinic NSR, continue rate control meds and OAC  - cont Xarelto  - informed about benefit > risk of adding on aspirin

## 2024-07-31 ENCOUNTER — TELEPHONE (OUTPATIENT)
Dept: CARDIOLOGY CLINIC | Age: 59
End: 2024-07-31

## 2024-07-31 NOTE — TELEPHONE ENCOUNTER
Pt called to get an appt to review medications and recent doctor's visit. Pt normally gets labs before routine visits with Dr. Yung and would like to know if he should do the same for the appt booked on 08.02.2024. Please notify if pt needs to complete before Friday.    [phone]

## 2024-08-01 NOTE — TELEPHONE ENCOUNTER
Spoke with patient, orders placed during his last appt isn't due till his 6 month follow up on 11/2024, his last resulted blood work was for the CBC, he will see Dr. Yung tomorrow to discuss his meds

## 2024-08-02 ENCOUNTER — OFFICE VISIT (OUTPATIENT)
Dept: CARDIOLOGY CLINIC | Age: 59
End: 2024-08-02
Payer: COMMERCIAL

## 2024-08-02 VITALS
DIASTOLIC BLOOD PRESSURE: 90 MMHG | BODY MASS INDEX: 28.68 KG/M2 | HEART RATE: 54 BPM | SYSTOLIC BLOOD PRESSURE: 120 MMHG | WEIGHT: 223.4 LBS

## 2024-08-02 DIAGNOSIS — I25.110 CORONARY ARTERY DISEASE INVOLVING NATIVE CORONARY ARTERY OF NATIVE HEART WITH UNSTABLE ANGINA PECTORIS (HCC): Primary | ICD-10-CM

## 2024-08-02 PROCEDURE — G8417 CALC BMI ABV UP PARAM F/U: HCPCS | Performed by: INTERNAL MEDICINE

## 2024-08-02 PROCEDURE — G8427 DOCREV CUR MEDS BY ELIG CLIN: HCPCS | Performed by: INTERNAL MEDICINE

## 2024-08-02 PROCEDURE — 3017F COLORECTAL CA SCREEN DOC REV: CPT | Performed by: INTERNAL MEDICINE

## 2024-08-02 PROCEDURE — 1036F TOBACCO NON-USER: CPT | Performed by: INTERNAL MEDICINE

## 2024-08-02 PROCEDURE — 3080F DIAST BP >= 90 MM HG: CPT | Performed by: INTERNAL MEDICINE

## 2024-08-02 PROCEDURE — 3074F SYST BP LT 130 MM HG: CPT | Performed by: INTERNAL MEDICINE

## 2024-08-02 PROCEDURE — 99214 OFFICE O/P EST MOD 30 MIN: CPT | Performed by: INTERNAL MEDICINE

## 2024-08-02 NOTE — PROGRESS NOTES
attenuation.  Coronary CTA 04/2024 only showed moderate CAD.  Symptoms have now resolved.    - Continue with baby aspirin, high-dose Lipitor and Toprol.    5. Smoking.   - Patient was counseled regarding the detrimental consequences of smoking and was advised to quit.    6.  Hypertension.  BP is currently controlled.    - Continue with Toprol 25 mg daily  - I provided extensive counseling about diet and advised a low sodium diet (< 2,000 mg sodium per day).    7.  Fatigue.  Symptoms are inconsistent.  They appear to be chronic.  Hemoglobin is within normal limits.  Patient had a recent ischemic evaluation with coronary CTA which was unremarkable.    - Will closely monitor.    8.  Hyperlipidemia.  Patient is currently on Lipitor 80 mg p.o. daily    - Yearly lipid panel.      Return in about 6 months (around 2/2/2025).    I have spent 42 minutes of reviewing records and face to face time with the patient with more than 50% spent counseling and coordinating care.       I have personally reviewed the reports and images of labs, radiological studies, cardiac studies including ECG's and telemetry, current and old medical records. The note was completed using EMR and Dragon dictation system. Every effort was made to ensure accuracy; however, inadvertent computerized transcription errors may be present.    All questions and concerns were addressed to the patient/family. Alternatives to my treatment were discussed.     I would like to thank you for providing me the opportunity to participate in the care of your patient. If you have any questions, please do not hesitate to contact me.     Rodney Yung MD, [facility], [facility]  [facility] [address] [address]  Main Office Phone: [phone]  Fax: [phone]

## 2024-12-03 ENCOUNTER — TELEPHONE (OUTPATIENT)
Dept: CARDIOLOGY CLINIC | Age: 59
End: 2024-12-03

## 2024-12-03 DIAGNOSIS — I10 PRIMARY HYPERTENSION: ICD-10-CM

## 2024-12-03 DIAGNOSIS — I25.110 CORONARY ARTERY DISEASE INVOLVING NATIVE CORONARY ARTERY OF NATIVE HEART WITH UNSTABLE ANGINA PECTORIS (HCC): Primary | ICD-10-CM

## 2024-12-03 DIAGNOSIS — I48.91 ATRIAL FIBRILLATION WITH RAPID VENTRICULAR RESPONSE (HCC): ICD-10-CM

## 2024-12-03 NOTE — TELEPHONE ENCOUNTER
Patient was called regarding rescheduling his appt on Feb 6th.  His appt has been rescheduled and he inquired if he needs to get any lab work done.  Call back [phone]

## 2025-02-18 ENCOUNTER — HOSPITAL ENCOUNTER (OUTPATIENT)
Age: 60
Discharge: HOME | End: 2025-02-18
Payer: COMMERCIAL

## 2025-02-18 DIAGNOSIS — I25.110 CORONARY ARTERY DISEASE INVOLVING NATIVE CORONARY ARTERY OF NATIVE HEART WITH UNSTABLE ANGINA PECTORIS (HCC): ICD-10-CM

## 2025-02-18 DIAGNOSIS — I48.91 ATRIAL FIBRILLATION WITH RAPID VENTRICULAR RESPONSE (HCC): ICD-10-CM

## 2025-02-18 DIAGNOSIS — I10 PRIMARY HYPERTENSION: ICD-10-CM

## 2025-02-18 LAB
ALBUMIN SERPL-MCNC: 4.2 G/DL (ref 3.4–5)
ALBUMIN/GLOB SERPL: 1.5 {RATIO} (ref 1.1–2.2)
ALP SERPL-CCNC: 105 U/L (ref 40–129)
ALT SERPL-CCNC: 26 U/L (ref 10–40)
ANION GAP SERPL CALCULATED.3IONS-SCNC: 8 MMOL/L (ref 3–16)
AST SERPL-CCNC: 30 U/L (ref 15–37)
BASOPHILS # BLD: 0 K/UL (ref 0–0.2)
BASOPHILS NFR BLD: 0.4 %
BILIRUB SERPL-MCNC: 0.3 MG/DL (ref 0–1)
BUN SERPL-MCNC: 10 MG/DL (ref 7–20)
CALCIUM SERPL-MCNC: 9.2 MG/DL (ref 8.3–10.6)
CHLORIDE SERPL-SCNC: 108 MMOL/L (ref 99–110)
CHOLEST SERPL-MCNC: 103 MG/DL (ref 0–199)
CK SERPL-CCNC: 181 U/L (ref 39–308)
CO2 SERPL-SCNC: 26 MMOL/L (ref 21–32)
CREAT SERPL-MCNC: 1.2 MG/DL (ref 0.9–1.3)
DEPRECATED RDW RBC AUTO: 12.9 % (ref 12.4–15.4)
EOSINOPHIL # BLD: 0.1 K/UL (ref 0–0.6)
EOSINOPHIL NFR BLD: 2 %
GFR SERPLBLD CREATININE-BSD FMLA CKD-EPI: 70 ML/MIN/{1.73_M2}
GLUCOSE SERPL-MCNC: 79 MG/DL (ref 70–99)
HCT VFR BLD AUTO: 44.2 % (ref 40.5–52.5)
HDLC SERPL-MCNC: 31 MG/DL (ref 40–60)
HGB BLD-MCNC: 15 G/DL (ref 13.5–17.5)
LDLC SERPL CALC-MCNC: 49 MG/DL
LYMPHOCYTES # BLD: 2.4 K/UL (ref 1–5.1)
LYMPHOCYTES NFR BLD: 37.9 %
MAGNESIUM SERPL-MCNC: 2.14 MG/DL (ref 1.8–2.4)
MCH RBC QN AUTO: 34.3 PG (ref 26–34)
MCHC RBC AUTO-ENTMCNC: 33.9 G/DL (ref 31–36)
MCV RBC AUTO: 101 FL (ref 80–100)
MONOCYTES # BLD: 0.9 K/UL (ref 0–1.3)
MONOCYTES NFR BLD: 14.8 %
NEUTROPHILS # BLD: 2.9 K/UL (ref 1.7–7.7)
NEUTROPHILS NFR BLD: 44.9 %
PLATELET # BLD AUTO: 249 K/UL (ref 135–450)
PMV BLD AUTO: 8.8 FL (ref 5–10.5)
POTASSIUM SERPL-SCNC: 4.8 MMOL/L (ref 3.5–5.1)
PROT SERPL-MCNC: 7 G/DL (ref 6.4–8.2)
RBC # BLD AUTO: 4.37 M/UL (ref 4.2–5.9)
SODIUM SERPL-SCNC: 142 MMOL/L (ref 136–145)
TRIGL SERPL-MCNC: 115 MG/DL (ref 0–150)
VLDLC SERPL CALC-MCNC: 23 MG/DL
WBC # BLD AUTO: 6.4 K/UL (ref 4–11)

## 2025-02-18 PROCEDURE — 85025 COMPLETE CBC W/AUTO DIFF WBC: CPT

## 2025-02-18 PROCEDURE — 80061 LIPID PANEL: CPT

## 2025-02-18 PROCEDURE — 36415 COLL VENOUS BLD VENIPUNCTURE: CPT

## 2025-02-18 PROCEDURE — 82550 ASSAY OF CK (CPK): CPT

## 2025-02-18 PROCEDURE — 80053 COMPREHEN METABOLIC PANEL: CPT

## 2025-02-18 PROCEDURE — 83735 ASSAY OF MAGNESIUM: CPT

## 2025-03-19 ENCOUNTER — OFFICE VISIT (OUTPATIENT)
Dept: CARDIOLOGY CLINIC | Age: 60
End: 2025-03-19

## 2025-03-19 VITALS
DIASTOLIC BLOOD PRESSURE: 68 MMHG | HEART RATE: 64 BPM | BODY MASS INDEX: 27.27 KG/M2 | WEIGHT: 212.4 LBS | SYSTOLIC BLOOD PRESSURE: 112 MMHG

## 2025-03-19 DIAGNOSIS — I48.91 ATRIAL FIBRILLATION WITH RAPID VENTRICULAR RESPONSE (HCC): Primary | ICD-10-CM

## 2025-03-19 NOTE — PROGRESS NOTES
Lipitor 80 mg p.o. daily, lipid profile is at goal.        - I emphasized the importance of taking Xarelto for secondary prevention of cardioembolic stroke.  Patient expressed understanding and he will resume taking it.  - Continue baby aspirin, Lipitor 80 daily, Toprol 25 daily.  - No need to continue with torsemide.  - Patient was counseled regarding the detrimental consequences of smoking and was advised to quit.       Return in about 6 months (around 9/19/2025).    I have spent 35 minutes of reviewing records and face to face time with the patient with more than 50% spent counseling and coordinating care.       I have personally reviewed the reports and images of labs, radiological studies, cardiac studies including ECG's and telemetry, current and old medical records. The note was completed using EMR and Dragon dictation system. Every effort was made to ensure accuracy; however, inadvertent computerized transcription errors may be present.    All questions and concerns were addressed to the patient/family. Alternatives to my treatment were discussed.     I would like to thank you for providing me the opportunity to participate in the care of your patient. If you have any questions, please do not hesitate to contact me.     Rodney Yung MD, FAC, [facility]  [facility] [place] [address]  Main Office Phone: [phone]  Fax: [phone]

## 2025-03-24 RX ORDER — METOPROLOL SUCCINATE 25 MG/1
25 TABLET, EXTENDED RELEASE ORAL DAILY
Qty: 90 TABLET | Refills: 3 | Status: SHIPPED | OUTPATIENT
Start: 2025-03-24

## 2025-03-24 RX ORDER — RIVAROXABAN 20 MG/1
20 TABLET, FILM COATED ORAL DAILY
Qty: 90 TABLET | Refills: 3 | Status: SHIPPED | OUTPATIENT
Start: 2025-03-24

## 2025-03-24 NOTE — TELEPHONE ENCOUNTER
Requested Prescriptions     Pending Prescriptions Disp Refills    XARELTO 20 MG TABS tablet [Pharmacy Med Name: XARELTO 20 MG TABLET] 90 tablet 3     Sig: TAKE 1 TABLET BY MOUTH DAILY WITH BREAKFAST    metoprolol succinate (TOPROL XL) 25 MG extended release tablet [Pharmacy Med Name: METOPROLOL SUCC ER 25 MG TAB] 90 tablet 3     Sig: TAKE 1 TABLET BY MOUTH DAILY            Checked Correct Pharmacy: Yes    Any changes since last refill? No     Number: 90  Refills: 3    Last OV: 3/19/2025 Provider: LEX    Next OV: 03.01.2025 Provider: LEX    Last Labs:   CBC:   Lab Results   Component Value Date    WBC 6.4 02/18/2025    HGB 15.0 02/18/2025    HCT 44.2 02/18/2025    .0 (H) 02/18/2025     02/18/2025     CMP:   Lab Results   Component Value Date     02/18/2025    K 4.8 02/18/2025     02/18/2025    CO2 26 02/18/2025    BUN 10 02/18/2025    CREATININE 1.2 02/18/2025    GLUCOSE 79 02/18/2025    CALCIUM 9.2 02/18/2025    BILITOT 0.3 02/18/2025    ALKPHOS 105 02/18/2025    AST 30 02/18/2025    ALT 26 02/18/2025    LABGLOM 70 02/18/2025    GFRAA >60 06/14/2022    AGRATIO 1.5 02/18/2025          Magnesium:   Lab Results   Component Value Date/Time    MG 2.14 02/18/2025 10:03 AM

## 2025-04-14 ENCOUNTER — TELEPHONE (OUTPATIENT)
Dept: CASE MANAGEMENT | Age: 60
End: 2025-04-14

## 2025-04-14 DIAGNOSIS — Z87.891 PERSONAL HISTORY OF TOBACCO USE, PRESENTING HAZARDS TO HEALTH: Primary | ICD-10-CM

## 2025-04-14 NOTE — TELEPHONE ENCOUNTER
Hira Valero, Dena JEFFRIES MD,    This is a friendly reminder that your patient is due for their annual lung screen on 5/9/2025.  For your convenience, I have pended the order for the scan.  If you do not agree with the need for the test, please cancel the order and let me know.      Patient will be mailed reminder letter to schedule.      Thank you,     Chana Simon  Lung Navigator  [facility]  [phone]    Future Appointments   Date Time Provider Department Center   10/1/2025 12:30 PM Rodney Yung MD [facility]       Last PCP Appt: 7/26/2024     Lung Screen Criteria  Age 50-80  Current smoker or quit within the last 15 years  Has => 20 pack year history.

## 2025-04-23 DIAGNOSIS — I63.511 CEREBROVASCULAR ACCIDENT (CVA) DUE TO OCCLUSION OF RIGHT MIDDLE CEREBRAL ARTERY (HCC): ICD-10-CM

## 2025-04-23 RX ORDER — ATORVASTATIN CALCIUM 80 MG/1
80 TABLET, FILM COATED ORAL NIGHTLY
Qty: 30 TABLET | Refills: 5 | Status: SHIPPED | OUTPATIENT
Start: 2025-04-23 | End: 2025-04-23 | Stop reason: SDUPTHER

## 2025-04-23 RX ORDER — ATORVASTATIN CALCIUM 80 MG/1
80 TABLET, FILM COATED ORAL NIGHTLY
Qty: 30 TABLET | Refills: 5 | Status: SHIPPED | OUTPATIENT
Start: 2025-04-23

## 2025-04-23 NOTE — TELEPHONE ENCOUNTER
Requested Prescriptions     Pending Prescriptions Disp Refills    atorvastatin (LIPITOR) 80 MG tablet 30 tablet 5     Sig: Take 1 tablet by mouth nightly       Last Clinic Visit:  7/26/2024     Next Clinic Appointment:  Visit date not found

## 2025-07-10 ENCOUNTER — CLINICAL DOCUMENTATION (OUTPATIENT)
Dept: CASE MANAGEMENT | Age: 60
End: 2025-07-10

## 2025-07-10 NOTE — PROGRESS NOTES
Patient meets lung screening criteria. Patient due for annual CT Lung Screening. Final reminder letter mailed. If ordered, Patient may call [phone] to schedule.     Lung Screen Criteria  Age 50-80  Current smoker or quit within the last 15 years  Has => 20 pack year history.    Future Appointments   Date Time Provider Department Center   10/1/2025 12:30 PM Rodney Yung MD [facility]       Active Lung Screen order on chart: No